# Patient Record
Sex: FEMALE | Race: ASIAN | NOT HISPANIC OR LATINO
[De-identification: names, ages, dates, MRNs, and addresses within clinical notes are randomized per-mention and may not be internally consistent; named-entity substitution may affect disease eponyms.]

---

## 2017-01-11 ENCOUNTER — APPOINTMENT (OUTPATIENT)
Dept: PULMONOLOGY | Facility: CLINIC | Age: 47
End: 2017-01-11

## 2017-01-11 VITALS
BODY MASS INDEX: 28.2 KG/M2 | DIASTOLIC BLOOD PRESSURE: 90 MMHG | SYSTOLIC BLOOD PRESSURE: 130 MMHG | HEART RATE: 90 BPM | OXYGEN SATURATION: 98 % | RESPIRATION RATE: 14 BRPM | TEMPERATURE: 98.2 F | HEIGHT: 68 IN | WEIGHT: 186.06 LBS

## 2017-01-12 LAB
ALBUMIN SERPL ELPH-MCNC: 4.2 G/DL
ALP BLD-CCNC: 64 U/L
ALT SERPL-CCNC: 13 U/L
ANION GAP SERPL CALC-SCNC: 17 MMOL/L
AST SERPL-CCNC: 18 U/L
BASOPHILS # BLD AUTO: 0.02 K/UL
BASOPHILS NFR BLD AUTO: 0.3 %
BILIRUB SERPL-MCNC: 0.7 MG/DL
BUN SERPL-MCNC: 9 MG/DL
CALCIUM SERPL-MCNC: 9.5 MG/DL
CHLORIDE SERPL-SCNC: 104 MMOL/L
CO2 SERPL-SCNC: 19 MMOL/L
CREAT SERPL-MCNC: 1.01 MG/DL
EOSINOPHIL # BLD AUTO: 0.07 K/UL
EOSINOPHIL NFR BLD AUTO: 1 %
GLUCOSE SERPL-MCNC: 69 MG/DL
HCT VFR BLD CALC: 43.5 %
HGB BLD-MCNC: 13.9 G/DL
IMM GRANULOCYTES NFR BLD AUTO: 0.1 %
LYMPHOCYTES # BLD AUTO: 1.24 K/UL
LYMPHOCYTES NFR BLD AUTO: 18.4 %
MAN DIFF?: NORMAL
MCHC RBC-ENTMCNC: 29.6 PG
MCHC RBC-ENTMCNC: 32 GM/DL
MCV RBC AUTO: 92.6 FL
MONOCYTES # BLD AUTO: 0.44 K/UL
MONOCYTES NFR BLD AUTO: 6.5 %
NEUTROPHILS # BLD AUTO: 4.96 K/UL
NEUTROPHILS NFR BLD AUTO: 73.7 %
PLATELET # BLD AUTO: 296 K/UL
POTASSIUM SERPL-SCNC: 4.3 MMOL/L
PROT SERPL-MCNC: 7.6 G/DL
RBC # BLD: 4.7 M/UL
RBC # FLD: 13.5 %
SODIUM SERPL-SCNC: 140 MMOL/L
WBC # FLD AUTO: 6.74 K/UL

## 2017-01-13 LAB — TOTAL IGE SMQN RAST: 5 KU/L

## 2017-01-20 ENCOUNTER — APPOINTMENT (OUTPATIENT)
Dept: PULMONOLOGY | Facility: CLINIC | Age: 47
End: 2017-01-20

## 2017-02-13 ENCOUNTER — MEDICATION RENEWAL (OUTPATIENT)
Age: 47
End: 2017-02-13

## 2017-02-15 ENCOUNTER — MEDICATION RENEWAL (OUTPATIENT)
Age: 47
End: 2017-02-15

## 2017-06-16 ENCOUNTER — APPOINTMENT (OUTPATIENT)
Dept: PULMONOLOGY | Facility: CLINIC | Age: 47
End: 2017-06-16

## 2017-06-16 VITALS
RESPIRATION RATE: 16 BRPM | SYSTOLIC BLOOD PRESSURE: 110 MMHG | TEMPERATURE: 98 F | HEIGHT: 68 IN | OXYGEN SATURATION: 98 % | DIASTOLIC BLOOD PRESSURE: 79 MMHG | HEART RATE: 71 BPM | BODY MASS INDEX: 28.19 KG/M2 | WEIGHT: 186 LBS

## 2017-06-16 RX ORDER — LIOTHYRONINE SODIUM 5 UG/1
5 TABLET ORAL
Qty: 45 | Refills: 0 | Status: ACTIVE | COMMUNITY
Start: 2017-01-20

## 2017-06-16 RX ORDER — FLUTICASONE PROPIONATE AND SALMETEROL XINAFOATE 115; 21 UG/1; UG/1
115-21 AEROSOL, METERED RESPIRATORY (INHALATION) TWICE DAILY
Qty: 1 | Refills: 5 | Status: DISCONTINUED | COMMUNITY
Start: 2017-02-15 | End: 2017-06-16

## 2017-06-27 ENCOUNTER — MEDICATION RENEWAL (OUTPATIENT)
Age: 47
End: 2017-06-27

## 2017-07-19 ENCOUNTER — MEDICATION RENEWAL (OUTPATIENT)
Age: 47
End: 2017-07-19

## 2017-08-13 ENCOUNTER — MOBILE ON CALL (OUTPATIENT)
Age: 47
End: 2017-08-13

## 2017-10-02 ENCOUNTER — MEDICATION RENEWAL (OUTPATIENT)
Age: 47
End: 2017-10-02

## 2017-10-02 RX ORDER — MOMETASONE 50 UG/1
50 SPRAY, METERED NASAL DAILY
Qty: 3 | Refills: 0 | Status: ACTIVE | COMMUNITY
Start: 2017-10-02 | End: 1900-01-01

## 2017-10-03 ENCOUNTER — MEDICATION RENEWAL (OUTPATIENT)
Age: 47
End: 2017-10-03

## 2017-11-01 ENCOUNTER — MEDICATION RENEWAL (OUTPATIENT)
Age: 47
End: 2017-11-01

## 2017-11-29 ENCOUNTER — MEDICATION RENEWAL (OUTPATIENT)
Age: 47
End: 2017-11-29

## 2017-12-14 ENCOUNTER — MEDICATION RENEWAL (OUTPATIENT)
Age: 47
End: 2017-12-14

## 2017-12-26 ENCOUNTER — MEDICATION RENEWAL (OUTPATIENT)
Age: 47
End: 2017-12-26

## 2018-02-16 ENCOUNTER — MEDICATION RENEWAL (OUTPATIENT)
Age: 48
End: 2018-02-16

## 2018-02-20 ENCOUNTER — APPOINTMENT (OUTPATIENT)
Dept: PULMONOLOGY | Facility: CLINIC | Age: 48
End: 2018-02-20
Payer: COMMERCIAL

## 2018-02-20 VITALS
SYSTOLIC BLOOD PRESSURE: 130 MMHG | HEART RATE: 72 BPM | TEMPERATURE: 97.5 F | HEIGHT: 65 IN | WEIGHT: 184 LBS | RESPIRATION RATE: 18 BRPM | DIASTOLIC BLOOD PRESSURE: 80 MMHG | OXYGEN SATURATION: 98 % | BODY MASS INDEX: 30.66 KG/M2

## 2018-02-20 LAB
BASOPHILS # BLD AUTO: 0.01 K/UL
BASOPHILS NFR BLD AUTO: 0.2 %
EOSINOPHIL # BLD AUTO: 0.07 K/UL
EOSINOPHIL # BLD MANUAL: 50 /UL
EOSINOPHIL NFR BLD AUTO: 1.2 %
HCT VFR BLD CALC: 41.4 %
HGB BLD-MCNC: 13.7 G/DL
IMM GRANULOCYTES NFR BLD AUTO: 0.2 %
LYMPHOCYTES # BLD AUTO: 1.61 K/UL
LYMPHOCYTES NFR BLD AUTO: 28 %
MAN DIFF?: NORMAL
MCHC RBC-ENTMCNC: 29.6 PG
MCHC RBC-ENTMCNC: 33.1 GM/DL
MCV RBC AUTO: 89.4 FL
MONOCYTES # BLD AUTO: 0.28 K/UL
MONOCYTES NFR BLD AUTO: 4.9 %
NEUTROPHILS # BLD AUTO: 3.76 K/UL
NEUTROPHILS NFR BLD AUTO: 65.5 %
PLATELET # BLD AUTO: 337 K/UL
RBC # BLD: 4.63 M/UL
RBC # FLD: 13.5 %
WBC # FLD AUTO: 5.74 K/UL

## 2018-02-20 PROCEDURE — 36415 COLL VENOUS BLD VENIPUNCTURE: CPT

## 2018-02-20 PROCEDURE — 99215 OFFICE O/P EST HI 40 MIN: CPT | Mod: 25

## 2018-02-25 LAB
ALBUMIN SERPL ELPH-MCNC: 4 G/DL
ALP BLD-CCNC: 67 U/L
ALT SERPL-CCNC: 14 U/L
ANION GAP SERPL CALC-SCNC: 13 MMOL/L
AST SERPL-CCNC: 20 U/L
BILIRUB SERPL-MCNC: 0.4 MG/DL
BUN SERPL-MCNC: 6 MG/DL
CALCIUM SERPL-MCNC: 9.4 MG/DL
CHLORIDE SERPL-SCNC: 97 MMOL/L
CO2 SERPL-SCNC: 23 MMOL/L
CREAT SERPL-MCNC: 0.79 MG/DL
GLUCOSE SERPL-MCNC: 77 MG/DL
POTASSIUM SERPL-SCNC: 4.2 MMOL/L
PROT SERPL-MCNC: 7.7 G/DL
SODIUM SERPL-SCNC: 133 MMOL/L
TOTAL IGE SMQN RAST: 7 KU/L

## 2018-02-27 ENCOUNTER — MEDICATION RENEWAL (OUTPATIENT)
Age: 48
End: 2018-02-27

## 2018-03-11 ENCOUNTER — RX RENEWAL (OUTPATIENT)
Age: 48
End: 2018-03-11

## 2018-03-19 ENCOUNTER — RX RENEWAL (OUTPATIENT)
Age: 48
End: 2018-03-19

## 2018-03-21 ENCOUNTER — MEDICATION RENEWAL (OUTPATIENT)
Age: 48
End: 2018-03-21

## 2018-03-22 ENCOUNTER — MEDICATION RENEWAL (OUTPATIENT)
Age: 48
End: 2018-03-22

## 2018-03-30 ENCOUNTER — APPOINTMENT (OUTPATIENT)
Dept: PULMONOLOGY | Facility: CLINIC | Age: 48
End: 2018-03-30
Payer: COMMERCIAL

## 2018-03-30 PROCEDURE — 94729 DIFFUSING CAPACITY: CPT

## 2018-03-30 PROCEDURE — 94726 PLETHYSMOGRAPHY LUNG VOLUMES: CPT

## 2018-03-30 PROCEDURE — 94060 EVALUATION OF WHEEZING: CPT

## 2018-04-05 ENCOUNTER — TRANSCRIPTION ENCOUNTER (OUTPATIENT)
Age: 48
End: 2018-04-05

## 2018-04-10 ENCOUNTER — RESULT REVIEW (OUTPATIENT)
Age: 48
End: 2018-04-10

## 2018-07-02 ENCOUNTER — RX RENEWAL (OUTPATIENT)
Age: 48
End: 2018-07-02

## 2018-08-16 ENCOUNTER — APPOINTMENT (OUTPATIENT)
Dept: PULMONOLOGY | Facility: CLINIC | Age: 48
End: 2018-08-16
Payer: COMMERCIAL

## 2018-08-16 VITALS
HEART RATE: 57 BPM | HEIGHT: 67 IN | TEMPERATURE: 97.4 F | BODY MASS INDEX: 28.88 KG/M2 | RESPIRATION RATE: 18 BRPM | WEIGHT: 184 LBS | DIASTOLIC BLOOD PRESSURE: 90 MMHG | SYSTOLIC BLOOD PRESSURE: 130 MMHG | OXYGEN SATURATION: 99 %

## 2018-08-16 PROCEDURE — 99215 OFFICE O/P EST HI 40 MIN: CPT

## 2018-08-16 RX ORDER — MOXIFLOXACIN HYDROCHLORIDE TABLETS, 400 MG 400 MG/1
400 TABLET, FILM COATED ORAL DAILY
Qty: 5 | Refills: 0 | Status: DISCONTINUED | COMMUNITY
Start: 2017-07-19 | End: 2018-08-16

## 2018-08-16 RX ORDER — PREDNISONE 10 MG/1
10 TABLET ORAL DAILY
Qty: 60 | Refills: 0 | Status: DISCONTINUED | COMMUNITY
Start: 2017-07-19 | End: 2018-08-16

## 2018-10-26 ENCOUNTER — MEDICATION RENEWAL (OUTPATIENT)
Age: 48
End: 2018-10-26

## 2018-10-26 ENCOUNTER — TRANSCRIPTION ENCOUNTER (OUTPATIENT)
Age: 48
End: 2018-10-26

## 2019-01-28 ENCOUNTER — RX RENEWAL (OUTPATIENT)
Age: 49
End: 2019-01-28

## 2019-02-01 ENCOUNTER — MEDICATION RENEWAL (OUTPATIENT)
Age: 49
End: 2019-02-01

## 2019-02-19 ENCOUNTER — MEDICATION RENEWAL (OUTPATIENT)
Age: 49
End: 2019-02-19

## 2019-02-19 RX ORDER — MOXIFLOXACIN HYDROCHLORIDE TABLETS, 400 MG 400 MG/1
400 TABLET, FILM COATED ORAL DAILY
Qty: 7 | Refills: 0 | Status: DISCONTINUED | COMMUNITY
Start: 2018-10-26 | End: 2019-02-19

## 2019-08-05 ENCOUNTER — RX RENEWAL (OUTPATIENT)
Age: 49
End: 2019-08-05

## 2019-08-09 ENCOUNTER — MEDICATION RENEWAL (OUTPATIENT)
Age: 49
End: 2019-08-09

## 2019-08-21 ENCOUNTER — MEDICATION RENEWAL (OUTPATIENT)
Age: 49
End: 2019-08-21

## 2019-08-22 ENCOUNTER — MEDICATION RENEWAL (OUTPATIENT)
Age: 49
End: 2019-08-22

## 2019-08-28 ENCOUNTER — TRANSCRIPTION ENCOUNTER (OUTPATIENT)
Age: 49
End: 2019-08-28

## 2019-08-29 RX ORDER — FLUTICASONE PROPIONATE 50 UG/1
50 SPRAY, METERED NASAL TWICE DAILY
Qty: 3 | Refills: 1 | Status: ACTIVE | OUTPATIENT
Start: 2017-06-16

## 2019-09-19 ENCOUNTER — TRANSCRIPTION ENCOUNTER (OUTPATIENT)
Age: 49
End: 2019-09-19

## 2019-09-20 NOTE — LETTER CLOSING
[Sincerely,] : Sincerely, [Mercy Garsia MD, FCCP] : Mercy Garsia MD, FCCP [Director, Pulmonary Hypertension Program] : Director, Pulmonary Hypertension Program [[facility]] : [facility] [Division of Pulmonary, Critical Care and Sleep Medicine] : Division of Pulmonary, Critical Care and Sleep Medicine [[facility]] : [facility] [Associate Professor of Medicine] : Associate Professor of Medicine

## 2019-09-20 NOTE — CONSULT LETTER
[Mercy Garsia MD, FCCP] : Mercy Garsia MD, FCCP [[facility]] : [facility] [Director, Pulmonary Hypertension Program] : Director, Pulmonary Hypertension Program [Division of Pulmonary, Critical Care and Sleep Medicine] : Division of Pulmonary, Critical Care and Sleep Medicine [Associate Professor of Medicine] : Associate Professor of Medicine

## 2019-09-20 NOTE — REVIEW OF SYSTEMS
[Cough] : cough [Wheezing] : wheezing [Dyspnea] : dyspnea [Hay Fever] : hay fever [Thyroid Problem] : thyroid problem [Negative] : Pulmonary Hypertension

## 2019-09-23 ENCOUNTER — LABORATORY RESULT (OUTPATIENT)
Age: 49
End: 2019-09-23

## 2019-09-23 ENCOUNTER — MED ADMIN CHARGE (OUTPATIENT)
Age: 49
End: 2019-09-23

## 2019-09-23 ENCOUNTER — APPOINTMENT (OUTPATIENT)
Dept: PULMONOLOGY | Facility: CLINIC | Age: 49
End: 2019-09-23
Payer: COMMERCIAL

## 2019-09-23 VITALS
SYSTOLIC BLOOD PRESSURE: 126 MMHG | DIASTOLIC BLOOD PRESSURE: 85 MMHG | HEART RATE: 80 BPM | HEIGHT: 67 IN | BODY MASS INDEX: 28.41 KG/M2 | WEIGHT: 181 LBS | TEMPERATURE: 98.6 F | RESPIRATION RATE: 16 BRPM

## 2019-09-23 DIAGNOSIS — R07.89 OTHER CHEST PAIN: ICD-10-CM

## 2019-09-23 PROCEDURE — 90686 IIV4 VACC NO PRSV 0.5 ML IM: CPT

## 2019-09-23 PROCEDURE — 99215 OFFICE O/P EST HI 40 MIN: CPT | Mod: 25

## 2019-09-23 PROCEDURE — 36415 COLL VENOUS BLD VENIPUNCTURE: CPT

## 2019-09-23 PROCEDURE — G0008: CPT

## 2019-09-23 NOTE — PHYSICAL EXAM
[General Appearance - Well Developed] : well developed [Normal Appearance] : normal appearance [Well Groomed] : well groomed [General Appearance - In No Acute Distress] : no acute distress [General Appearance - Well Nourished] : well nourished [No Deformities] : no deformities [Normal Conjunctiva] : the conjunctiva exhibited no abnormalities [Eyelids - No Xanthelasma] : the eyelids demonstrated no xanthelasmas [Neck Appearance] : the appearance of the neck was normal [Normal Oropharynx] : normal oropharynx [Heart Rate And Rhythm] : heart rate and rhythm were normal [Heart Sounds] : normal S1 and S2 [Murmurs] : no murmurs present [Respiration, Rhythm And Depth] : normal respiratory rhythm and effort [Exaggerated Use Of Accessory Muscles For Inspiration] : no accessory muscle use [Lesions: ____] : lesions [unfilled] [Abdomen Soft] : soft [Abdomen Tenderness] : non-tender [Abdomen Mass (___ Cm)] : no abdominal mass palpated [Gait - Sufficient For Exercise Testing] : the gait was sufficient for exercise testing [Abnormal Walk] : normal gait [No Venous Stasis] : no venous stasis [Skin Color & Pigmentation] : normal skin color and pigmentation [Skin Lesions] : no skin lesions [No Skin Ulcers] : no skin ulcer [Cranial Nerves] : cranial nerves 2-12 were intact [No Xanthoma] : no  xanthoma was observed [Impaired Insight] : insight and judgment were intact [Affect] : the affect was normal [Oriented To Time, Place, And Person] : oriented to person, place, and time [Normal Oral Mucosa] : normal oral mucosa [Petechial Hemorrhages (___cm)] : no petechial hemorrhages [Nail Clubbing] : no clubbing of the fingernails [Cyanosis, Localized] : no localized cyanosis [] : no ischemic changes [Auscultation Breath Sounds / Voice Sounds] : lungs were clear to auscultation bilaterally [FreeTextEntry1] : nares  erythematous

## 2019-09-23 NOTE — REASON FOR VISIT
[Follow-Up] : a follow-up visit [Shortness of Breath] : shortness of Breath [Asthma] : asthma [Cough] : cough [FreeTextEntry1] : asthma [FreeTextEntry2] : uri since last week treated w/biaxin & amoxicillin & medrol pack w/Dr Paula

## 2019-09-23 NOTE — HISTORY OF PRESENT ILLNESS
[Worsened] : have worsened [Cough] : worsened coughing [Difficulty Breathing During Exertion] : worsened dyspnea on exertion [Oxygen] : the patient uses no supplemental oxygen [FreeTextEntry1] : ---------50yo w/untreated asthma- self referred for 2nd opinion for her bronchial asthma--------has not been on prednisone has never been intubated-- no history of fever chills rigors chest hemoptysis----------No history of , fever, chills , rigors, chestpain, or hemoptysis. Questionable history of Raynauds phenomenon. No h/o significant weight loss in last few months. No history of liver dysfunction , collagen vascular disorder or chronic thromboembolic disease. I would classify her dyspnea as WHO  FUNCTIONAL CLASS II---------------------\par PFT 2018 mild obstruction\par cxr- 2018 clear lungs\par ---------------\par ----- 5/ 2015 ---- ct chest -----------4mm left apical nodule and 3-4 m, left lingula nodule .\par h/o allergy testing w/allergy to mold-------------\par lifelong nonsmoker-------------\par \par \par sept 2019- asthma- s/p urgicenter visit last week for CP- states EKG normal. No resp complaints-- on  inhaled steroids and bronchodilators, no further exacerbations

## 2019-09-24 LAB
25(OH)D3 SERPL-MCNC: 23.3 NG/ML
ALBUMIN SERPL ELPH-MCNC: 4.4 G/DL
ALP BLD-CCNC: 63 U/L
ALT SERPL-CCNC: 18 U/L
ANION GAP SERPL CALC-SCNC: 12 MMOL/L
AST SERPL-CCNC: 19 U/L
BASOPHILS # BLD AUTO: 0.03 K/UL
BASOPHILS NFR BLD AUTO: 0.5 %
BILIRUB SERPL-MCNC: 0.5 MG/DL
BUN SERPL-MCNC: 8 MG/DL
CALCIUM SERPL-MCNC: 9.3 MG/DL
CHLORIDE SERPL-SCNC: 102 MMOL/L
CO2 SERPL-SCNC: 23 MMOL/L
CREAT SERPL-MCNC: 0.87 MG/DL
DEPRECATED KAPPA LC FREE/LAMBDA SER: 1.14 RATIO
EOSINOPHIL # BLD AUTO: 0.05 K/UL
EOSINOPHIL NFR BLD AUTO: 0.8 %
FERRITIN SERPL-MCNC: 16 NG/ML
GLUCOSE SERPL-MCNC: 84 MG/DL
HCT VFR BLD CALC: 41.6 %
HGB BLD-MCNC: 13.5 G/DL
IGA SER QL IEP: 95 MG/DL
IGG SER QL IEP: 1342 MG/DL
IGM SER QL IEP: 309 MG/DL
IMM GRANULOCYTES NFR BLD AUTO: 0.2 %
KAPPA LC CSF-MCNC: 1.33 MG/DL
KAPPA LC SERPL-MCNC: 1.51 MG/DL
LYMPHOCYTES # BLD AUTO: 1.66 K/UL
LYMPHOCYTES NFR BLD AUTO: 27.1 %
MAN DIFF?: NORMAL
MCHC RBC-ENTMCNC: 29.2 PG
MCHC RBC-ENTMCNC: 32.5 GM/DL
MCV RBC AUTO: 89.8 FL
MONOCYTES # BLD AUTO: 0.49 K/UL
MONOCYTES NFR BLD AUTO: 8 %
NEUTROPHILS # BLD AUTO: 3.89 K/UL
NEUTROPHILS NFR BLD AUTO: 63.4 %
PLATELET # BLD AUTO: 281 K/UL
POTASSIUM SERPL-SCNC: 4.3 MMOL/L
PROT SERPL-MCNC: 7.3 G/DL
RBC # BLD: 4.63 M/UL
RBC # FLD: 12.7 %
SODIUM SERPL-SCNC: 137 MMOL/L
TSH SERPL-ACNC: 0.14 UIU/ML
WBC # FLD AUTO: 6.13 K/UL

## 2019-09-24 RX ORDER — LEVOFLOXACIN 500 MG/1
500 TABLET, FILM COATED ORAL DAILY
Qty: 7 | Refills: 0 | Status: DISCONTINUED | COMMUNITY
Start: 2019-02-19 | End: 2019-09-24

## 2019-10-01 LAB — TOTAL IGE SMQN RAST: 8 KU/L

## 2019-10-02 LAB
A ALTERNATA IGE QN: <0.1 KUA/L
A FUMIGATUS IGE QN: <0.1 KUA/L
BERMUDA GRASS IGE QN: <0.1 KUA/L
BOXELDER IGE QN: <0.1 KUA/L
C HERBARUM IGE QN: <0.1 KUA/L
CALIF WALNUT IGE QN: <0.1 KUA/L
CAT DANDER IGE QN: <0.1 KUA/L
CMN PIGWEED IGE QN: <0.1 KUA/L
COMMON RAGWEED IGE QN: <0.1 KUA/L
COTTONWOOD IGE QN: <0.1 KUA/L
D FARINAE IGE QN: <0.1 KUA/L
D PTERONYSS IGE QN: <0.1 KUA/L
DEPRECATED A ALTERNATA IGE RAST QL: 0
DEPRECATED A FUMIGATUS IGE RAST QL: 0
DEPRECATED BERMUDA GRASS IGE RAST QL: 0
DEPRECATED BOXELDER IGE RAST QL: 0
DEPRECATED C HERBARUM IGE RAST QL: 0
DEPRECATED CAT DANDER IGE RAST QL: 0
DEPRECATED COMMON PIGWEED IGE RAST QL: 0
DEPRECATED COMMON RAGWEED IGE RAST QL: 0
DEPRECATED COTTONWOOD IGE RAST QL: 0
DEPRECATED D FARINAE IGE RAST QL: 0
DEPRECATED D PTERONYSS IGE RAST QL: 0
DEPRECATED DOG DANDER IGE RAST QL: 0
DEPRECATED GOOSEFOOT IGE RAST QL: 0
DEPRECATED LONDON PLANE IGE RAST QL: 0
DEPRECATED MUGWORT IGE RAST QL: 0
DEPRECATED P NOTATUM IGE RAST QL: 0
DEPRECATED RED CEDAR IGE RAST QL: 0
DEPRECATED ROACH IGE RAST QL: 0
DEPRECATED SHEEP SORREL IGE RAST QL: 0
DEPRECATED SILVER BIRCH IGE RAST QL: 0
DEPRECATED TIMOTHY IGE RAST QL: 0
DEPRECATED WHITE ASH IGE RAST QL: 0
DEPRECATED WHITE OAK IGE RAST QL: 0
DOG DANDER IGE QN: <0.1 KUA/L
GOOSEFOOT IGE QN: <0.1 KUA/L
LONDON PLANE IGE QN: <0.1 KUA/L
MUGWORT IGE QN: <0.1 KUA/L
MULBERRY (T70) CLASS: 0
MULBERRY (T70) CONC: <0.1 KUA/L
P NOTATUM IGE QN: <0.1 KUA/L
RED CEDAR IGE QN: <0.1 KUA/L
ROACH IGE QN: <0.1 KUA/L
SHEEP SORREL IGE QN: <0.1 KUA/L
SILVER BIRCH IGE QN: <0.1 KUA/L
TIMOTHY IGE QN: <0.1 KUA/L
TREE ALLERG MIX1 IGE QL: 0
WHITE ASH IGE QN: <0.1 KUA/L
WHITE ELM IGE QN: 0
WHITE ELM IGE QN: <0.1 KUA/L
WHITE OAK IGE QN: <0.1 KUA/L

## 2020-03-23 ENCOUNTER — TRANSCRIPTION ENCOUNTER (OUTPATIENT)
Age: 50
End: 2020-03-23

## 2020-06-23 ENCOUNTER — TRANSCRIPTION ENCOUNTER (OUTPATIENT)
Age: 50
End: 2020-06-23

## 2020-07-28 ENCOUNTER — APPOINTMENT (OUTPATIENT)
Dept: PULMONOLOGY | Facility: CLINIC | Age: 50
End: 2020-07-28
Payer: COMMERCIAL

## 2020-07-28 PROCEDURE — 99215 OFFICE O/P EST HI 40 MIN: CPT | Mod: 95

## 2020-07-28 NOTE — HISTORY OF PRESENT ILLNESS
[Home] : at home, [unfilled] , at the time of the visit. [Medical Office: ([facility])___] : at the medical office located in  [Verbal consent obtained from patient] : the patient, [unfilled] [TextBox_4] : -49yo w/untreated asthma- self referred for 2nd opinion for her bronchial asthma--------has not been on prednisone has never been intubated-- no history of fever chills rigors chest hemoptysis----------No history of , fever, chills , rigors, chestpain, or hemoptysis. Questionable history of Raynauds phenomenon. No h/o significant weight loss in last few months. No history of liver dysfunction , collagen vascular disorder or chronic thromboembolic disease. I would classify her dyspnea as WHO FUNCTIONAL CLASS II---------------------\par PFT 2018 mild obstruction\par cxr- 2018 clear lungs\par ---------------\par ----- 5/ 2015 ---- ct chest -----------4mm left apical nodule and 3-4 m, left lingula nodule .\par h/o allergy testing w/allergy to mold-------------\par lifelong nonsmoker-------------\par \par \par sept 2019- asthma- s/p urgicenter visit last week for CP- states EKG normal. No resp complaints-- on inhaled steroids and bronchodilators, no further exacerbations \par  \par july 2020----  has been using albuterol more frequently--no fever , cough

## 2020-07-28 NOTE — DISCUSSION/SUMMARY
[FreeTextEntry1] : -Assessment and Plan--------49 yo w/asthma exacerbation. continue symbicort. CONTINUE SALINE NASAL SPRAY - \par \par contd azelastine, continue Singulair, albuterol- Symbicort----\par lrepeat  PFT\par keep record of her  rescue inhaler use \par f/u in  after pft months\par \par Patient at this time will follow the above mentioned recommendations and return back for follow up visit. If you have any questions I can be reached at [phone] or [phone]. \par \par [facility], [facility] \par Director, Pulmonary Hypertension Program \par [facility] \par Division of Pulmonary, Critical Care and Sleep Medicine

## 2020-08-19 ENCOUNTER — APPOINTMENT (OUTPATIENT)
Dept: DISASTER EMERGENCY | Facility: CLINIC | Age: 50
End: 2020-08-19

## 2020-08-19 DIAGNOSIS — Z01.818 ENCOUNTER FOR OTHER PREPROCEDURAL EXAMINATION: ICD-10-CM

## 2020-08-20 LAB — SARS-COV-2 N GENE NPH QL NAA+PROBE: NOT DETECTED

## 2020-08-21 NOTE — LETTER CLOSING
[Sincerely,] : Sincerely, [Director, Pulmonary Hypertension Program] : Director, Pulmonary Hypertension Program [Mercy Garsia MD, FCCP] : Mercy Garsia MD, FCCP [Division of Pulmonary, Critical Care and Sleep Medicine] : Division of Pulmonary, Critical Care and Sleep Medicine [[facility]] : [facility] [[facility]] : [facility] [Associate Professor of Medicine] : Associate Professor of Medicine

## 2020-08-21 NOTE — CONSULT LETTER
[Mercy Garsia MD, FCCP] : Mercy Garsia MD, FCCP [Director, Pulmonary Hypertension Program] : Director, Pulmonary Hypertension Program [Division of Pulmonary, Critical Care and Sleep Medicine] : Division of Pulmonary, Critical Care and Sleep Medicine [[facility]] : [facility] [Associate Professor of Medicine] : Associate Professor of Medicine

## 2020-08-21 NOTE — REVIEW OF SYSTEMS
[Cough] : cough [Dyspnea] : dyspnea [Wheezing] : wheezing [Hay Fever] : hay fever [Thyroid Problem] : thyroid problem [Negative] : Sleep Disorder

## 2020-08-24 ENCOUNTER — APPOINTMENT (OUTPATIENT)
Dept: PULMONOLOGY | Facility: CLINIC | Age: 50
End: 2020-08-24
Payer: COMMERCIAL

## 2020-08-24 ENCOUNTER — MED ADMIN CHARGE (OUTPATIENT)
Age: 50
End: 2020-08-24

## 2020-08-24 VITALS
TEMPERATURE: 97.4 F | SYSTOLIC BLOOD PRESSURE: 137 MMHG | OXYGEN SATURATION: 98 % | HEIGHT: 67 IN | HEART RATE: 77 BPM | DIASTOLIC BLOOD PRESSURE: 85 MMHG | WEIGHT: 187 LBS | RESPIRATION RATE: 15 BRPM | BODY MASS INDEX: 29.35 KG/M2

## 2020-08-24 VITALS — HEIGHT: 67 IN | WEIGHT: 187 LBS | TEMPERATURE: 97.4 F | HEART RATE: 75 BPM | BODY MASS INDEX: 29.35 KG/M2

## 2020-08-24 DIAGNOSIS — Z23 ENCOUNTER FOR IMMUNIZATION: ICD-10-CM

## 2020-08-24 LAB
ALBUMIN SERPL ELPH-MCNC: 4.6 G/DL
ALP BLD-CCNC: 65 U/L
ALT SERPL-CCNC: 18 U/L
ANION GAP SERPL CALC-SCNC: 11 MMOL/L
AST SERPL-CCNC: 18 U/L
BASOPHILS # BLD AUTO: 0.03 K/UL
BASOPHILS NFR BLD AUTO: 0.6 %
BILIRUB SERPL-MCNC: 0.4 MG/DL
BUN SERPL-MCNC: 7 MG/DL
CALCIUM SERPL-MCNC: 8.9 MG/DL
CHLORIDE SERPL-SCNC: 99 MMOL/L
CO2 SERPL-SCNC: 25 MMOL/L
CREAT SERPL-MCNC: 0.94 MG/DL
EOSINOPHIL # BLD AUTO: 0.05 K/UL
EOSINOPHIL NFR BLD AUTO: 1 %
GLUCOSE SERPL-MCNC: 90 MG/DL
HCT VFR BLD CALC: 42.5 %
HGB BLD-MCNC: 14.1 G/DL
IMM GRANULOCYTES NFR BLD AUTO: 0.2 %
LYMPHOCYTES # BLD AUTO: 1.74 K/UL
LYMPHOCYTES NFR BLD AUTO: 34.3 %
MAN DIFF?: NORMAL
MCHC RBC-ENTMCNC: 30.1 PG
MCHC RBC-ENTMCNC: 33.2 GM/DL
MCV RBC AUTO: 90.8 FL
MONOCYTES # BLD AUTO: 0.47 K/UL
MONOCYTES NFR BLD AUTO: 9.3 %
NEUTROPHILS # BLD AUTO: 2.78 K/UL
NEUTROPHILS NFR BLD AUTO: 54.6 %
PLATELET # BLD AUTO: 299 K/UL
POTASSIUM SERPL-SCNC: 4 MMOL/L
PROT SERPL-MCNC: 6.9 G/DL
RBC # BLD: 4.68 M/UL
RBC # FLD: 12.8 %
SARS-COV-2 IGG SERPL IA-ACNC: 0.17 INDEX
SARS-COV-2 IGG SERPL QL IA: NEGATIVE
SODIUM SERPL-SCNC: 135 MMOL/L
WBC # FLD AUTO: 5.08 K/UL

## 2020-08-24 PROCEDURE — 90686 IIV4 VACC NO PRSV 0.5 ML IM: CPT

## 2020-08-24 PROCEDURE — 36415 COLL VENOUS BLD VENIPUNCTURE: CPT

## 2020-08-24 PROCEDURE — ZZZZZ: CPT

## 2020-08-24 PROCEDURE — 94729 DIFFUSING CAPACITY: CPT

## 2020-08-24 PROCEDURE — 94010 BREATHING CAPACITY TEST: CPT

## 2020-08-24 PROCEDURE — 99215 OFFICE O/P EST HI 40 MIN: CPT | Mod: 25

## 2020-08-24 PROCEDURE — G0008: CPT

## 2020-08-24 PROCEDURE — 94726 PLETHYSMOGRAPHY LUNG VOLUMES: CPT

## 2020-08-24 NOTE — HISTORY OF PRESENT ILLNESS
[Worsened] : have worsened [Cough] : worsened coughing [Difficulty Breathing During Exertion] : worsened dyspnea on exertion [TextBox_4] : 52 yo w/untreated asthma- self referred for 2nd opinion for her bronchial asthma--------has not been on prednisone has never been intubated-- no history of fever chills rigors chest hemoptysis----------No history of , fever, chills , rigors, chestpain, or hemoptysis. Questionable history of Raynauds phenomenon. No h/o significant weight loss in last few months. No history of liver dysfunction , collagen vascular disorder or chronic thromboembolic disease. I would classify her dyspnea as WHO  FUNCTIONAL CLASS II---------------------\par PFT 2018 mild obstruction\par cxr- 2018 clear lungs\par ---------------\par ----- 5/ 2015 ---- ct chest -----------4mm left apical nodule and 3-4 m, left lingula nodule .\par h/o allergy testing w/allergy to mold-------------\par lifelong nonsmoker-------------\par \par \par sept 2019- asthma- s/p urgicenter visit last week for CP- states EKG normal. No resp complaints-- on  inhaled steroids and bronchodilators, no further exacerbations\par \par 8/2020 pft normal lung volumes\par august 2020 asthma controlled on symbicort and singulair, uses ventolin as needed [Oxygen] : the patient uses no supplemental oxygen [FreeTextEntry1] : ---------

## 2020-08-24 NOTE — PHYSICAL EXAM
[General Appearance - Well Developed] : well developed [Normal Appearance] : normal appearance [General Appearance - Well Nourished] : well nourished [Well Groomed] : well groomed [General Appearance - In No Acute Distress] : no acute distress [No Deformities] : no deformities [Normal Conjunctiva] : the conjunctiva exhibited no abnormalities [Eyelids - No Xanthelasma] : the eyelids demonstrated no xanthelasmas [Normal Oropharynx] : normal oropharynx [Neck Appearance] : the appearance of the neck was normal [Heart Rate And Rhythm] : heart rate and rhythm were normal [Heart Sounds] : normal S1 and S2 [Murmurs] : no murmurs present [Respiration, Rhythm And Depth] : normal respiratory rhythm and effort [Auscultation Breath Sounds / Voice Sounds] : lungs were clear to auscultation bilaterally [Exaggerated Use Of Accessory Muscles For Inspiration] : no accessory muscle use [Abdomen Soft] : soft [Lesions: ____] : lesions [unfilled] [Abdomen Mass (___ Cm)] : no abdominal mass palpated [Abdomen Tenderness] : non-tender [Abnormal Walk] : normal gait [Gait - Sufficient For Exercise Testing] : the gait was sufficient for exercise testing [Skin Color & Pigmentation] : normal skin color and pigmentation [Skin Lesions] : no skin lesions [No Venous Stasis] : no venous stasis [No Skin Ulcers] : no skin ulcer [No Xanthoma] : no  xanthoma was observed [Oriented To Time, Place, And Person] : oriented to person, place, and time [Cranial Nerves] : cranial nerves 2-12 were intact [Impaired Insight] : insight and judgment were intact [Normal Oral Mucosa] : normal oral mucosa [Affect] : the affect was normal [Nail Clubbing] : no clubbing of the fingernails [Cyanosis, Localized] : no localized cyanosis [] : no ischemic changes [Petechial Hemorrhages (___cm)] : no petechial hemorrhages [FreeTextEntry1] : nares  erythematous

## 2020-08-24 NOTE — REASON FOR VISIT
[Cough] : cough [Shortness of Breath] : shortness of Breath [Follow-Up] : a follow-up visit [Asthma] : asthma [FreeTextEntry1] : asthma

## 2020-08-24 NOTE — DISCUSSION/SUMMARY
[FreeTextEntry1] : -------------Assessment and Plan----51 yo with asthma well controlled-  continue symbicort.  CONTINUE  SALINE NASAL SPRAY,  Singulair,  albuterol- as needed\par labs drawn today \par s/p influenza vac\par f/u in 4 months\par \par Patient at this time  will follow  the above mentioned recommendations and return back for follow up visit. If you have any questions  I can be reached  at [phone]  or  [phone].  \par \par Mercy Garsia MD, FCCP \par Director, Pulmonary Hypertension Program \par [facility] \par Division of Pulmonary, Critical Care and Sleep Medicine \par  Professor of Medicine \par [employer]\par

## 2020-08-26 LAB — TOTAL IGE SMQN RAST: 6 KU/L

## 2020-10-01 ENCOUNTER — RX RENEWAL (OUTPATIENT)
Age: 50
End: 2020-10-01

## 2020-10-10 ENCOUNTER — NON-APPOINTMENT (OUTPATIENT)
Age: 50
End: 2020-10-10

## 2020-10-11 ENCOUNTER — RX RENEWAL (OUTPATIENT)
Age: 50
End: 2020-10-11

## 2021-02-25 ENCOUNTER — EMERGENCY (EMERGENCY)
Facility: HOSPITAL | Age: 51
LOS: 1 days | Discharge: HOME | End: 2021-02-25
Attending: EMERGENCY MEDICINE | Admitting: EMERGENCY MEDICINE
Payer: COMMERCIAL

## 2021-02-25 ENCOUNTER — TRANSCRIPTION ENCOUNTER (OUTPATIENT)
Age: 51
End: 2021-02-25

## 2021-02-25 VITALS
SYSTOLIC BLOOD PRESSURE: 168 MMHG | TEMPERATURE: 98 F | DIASTOLIC BLOOD PRESSURE: 86 MMHG | HEART RATE: 66 BPM | OXYGEN SATURATION: 100 % | WEIGHT: 182.1 LBS | RESPIRATION RATE: 18 BRPM

## 2021-02-25 VITALS
TEMPERATURE: 98 F | RESPIRATION RATE: 18 BRPM | DIASTOLIC BLOOD PRESSURE: 86 MMHG | HEART RATE: 87 BPM | OXYGEN SATURATION: 100 % | SYSTOLIC BLOOD PRESSURE: 136 MMHG

## 2021-02-25 LAB
ALBUMIN SERPL ELPH-MCNC: 3.7 G/DL — SIGNIFICANT CHANGE UP (ref 3.3–5)
ALP SERPL-CCNC: 59 U/L — SIGNIFICANT CHANGE UP (ref 40–120)
ALT FLD-CCNC: 22 U/L — SIGNIFICANT CHANGE UP (ref 12–78)
ANION GAP SERPL CALC-SCNC: 5 MMOL/L — SIGNIFICANT CHANGE UP (ref 5–17)
AST SERPL-CCNC: 24 U/L — SIGNIFICANT CHANGE UP (ref 15–37)
BASOPHILS # BLD AUTO: 0.03 K/UL — SIGNIFICANT CHANGE UP (ref 0–0.2)
BASOPHILS NFR BLD AUTO: 0.2 % — SIGNIFICANT CHANGE UP (ref 0–2)
BILIRUB SERPL-MCNC: 0.7 MG/DL — SIGNIFICANT CHANGE UP (ref 0.2–1.2)
BUN SERPL-MCNC: 5 MG/DL — LOW (ref 7–23)
CALCIUM SERPL-MCNC: 8.2 MG/DL — LOW (ref 8.5–10.1)
CHLORIDE SERPL-SCNC: 103 MMOL/L — SIGNIFICANT CHANGE UP (ref 96–108)
CO2 SERPL-SCNC: 25 MMOL/L — SIGNIFICANT CHANGE UP (ref 22–31)
CREAT SERPL-MCNC: 0.94 MG/DL — SIGNIFICANT CHANGE UP (ref 0.5–1.3)
EOSINOPHIL # BLD AUTO: 0.01 K/UL — SIGNIFICANT CHANGE UP (ref 0–0.5)
EOSINOPHIL NFR BLD AUTO: 0.1 % — SIGNIFICANT CHANGE UP (ref 0–6)
GLUCOSE SERPL-MCNC: 88 MG/DL — SIGNIFICANT CHANGE UP (ref 70–99)
HCG SERPL-ACNC: <1 MIU/ML — SIGNIFICANT CHANGE UP
HCT VFR BLD CALC: 40.6 % — SIGNIFICANT CHANGE UP (ref 34.5–45)
HGB BLD-MCNC: 13.4 G/DL — SIGNIFICANT CHANGE UP (ref 11.5–15.5)
IMM GRANULOCYTES NFR BLD AUTO: 0.5 % — SIGNIFICANT CHANGE UP (ref 0–1.5)
LYMPHOCYTES # BLD AUTO: 0.83 K/UL — LOW (ref 1–3.3)
LYMPHOCYTES # BLD AUTO: 5.7 % — LOW (ref 13–44)
MAGNESIUM SERPL-MCNC: 2 MG/DL — SIGNIFICANT CHANGE UP (ref 1.6–2.6)
MCHC RBC-ENTMCNC: 28.7 PG — SIGNIFICANT CHANGE UP (ref 27–34)
MCHC RBC-ENTMCNC: 33 GM/DL — SIGNIFICANT CHANGE UP (ref 32–36)
MCV RBC AUTO: 86.9 FL — SIGNIFICANT CHANGE UP (ref 80–100)
MONOCYTES # BLD AUTO: 0.68 K/UL — SIGNIFICANT CHANGE UP (ref 0–0.9)
MONOCYTES NFR BLD AUTO: 4.7 % — SIGNIFICANT CHANGE UP (ref 2–14)
NEUTROPHILS # BLD AUTO: 12.88 K/UL — HIGH (ref 1.8–7.4)
NEUTROPHILS NFR BLD AUTO: 88.8 % — HIGH (ref 43–77)
NRBC # BLD: 0 /100 WBCS — SIGNIFICANT CHANGE UP (ref 0–0)
PLATELET # BLD AUTO: 254 K/UL — SIGNIFICANT CHANGE UP (ref 150–400)
POTASSIUM SERPL-MCNC: 4.4 MMOL/L — SIGNIFICANT CHANGE UP (ref 3.5–5.3)
POTASSIUM SERPL-SCNC: 4.4 MMOL/L — SIGNIFICANT CHANGE UP (ref 3.5–5.3)
PROT SERPL-MCNC: 7.5 G/DL — SIGNIFICANT CHANGE UP (ref 6–8.3)
RBC # BLD: 4.67 M/UL — SIGNIFICANT CHANGE UP (ref 3.8–5.2)
RBC # FLD: 12.7 % — SIGNIFICANT CHANGE UP (ref 10.3–14.5)
SODIUM SERPL-SCNC: 133 MMOL/L — LOW (ref 135–145)
TROPONIN I SERPL-MCNC: <.015 NG/ML — SIGNIFICANT CHANGE UP (ref 0.01–0.04)
WBC # BLD: 14.5 K/UL — HIGH (ref 3.8–10.5)
WBC # FLD AUTO: 14.5 K/UL — HIGH (ref 3.8–10.5)

## 2021-02-25 PROCEDURE — 73660 X-RAY EXAM OF TOE(S): CPT

## 2021-02-25 PROCEDURE — 96360 HYDRATION IV INFUSION INIT: CPT

## 2021-02-25 PROCEDURE — 83735 ASSAY OF MAGNESIUM: CPT

## 2021-02-25 PROCEDURE — 70450 CT HEAD/BRAIN W/O DYE: CPT | Mod: 26,MF

## 2021-02-25 PROCEDURE — 85025 COMPLETE CBC W/AUTO DIFF WBC: CPT

## 2021-02-25 PROCEDURE — 84702 CHORIONIC GONADOTROPIN TEST: CPT

## 2021-02-25 PROCEDURE — 93005 ELECTROCARDIOGRAM TRACING: CPT

## 2021-02-25 PROCEDURE — 71045 X-RAY EXAM CHEST 1 VIEW: CPT | Mod: 26

## 2021-02-25 PROCEDURE — 80053 COMPREHEN METABOLIC PANEL: CPT

## 2021-02-25 PROCEDURE — 99284 EMERGENCY DEPT VISIT MOD MDM: CPT | Mod: 25

## 2021-02-25 PROCEDURE — 99285 EMERGENCY DEPT VISIT HI MDM: CPT

## 2021-02-25 PROCEDURE — 93010 ELECTROCARDIOGRAM REPORT: CPT

## 2021-02-25 PROCEDURE — 36415 COLL VENOUS BLD VENIPUNCTURE: CPT

## 2021-02-25 PROCEDURE — 84484 ASSAY OF TROPONIN QUANT: CPT

## 2021-02-25 PROCEDURE — 82962 GLUCOSE BLOOD TEST: CPT

## 2021-02-25 PROCEDURE — 71045 X-RAY EXAM CHEST 1 VIEW: CPT

## 2021-02-25 PROCEDURE — G1004: CPT

## 2021-02-25 PROCEDURE — 73660 X-RAY EXAM OF TOE(S): CPT | Mod: 26,LT

## 2021-02-25 PROCEDURE — 70450 CT HEAD/BRAIN W/O DYE: CPT

## 2021-02-25 RX ORDER — SODIUM CHLORIDE 9 MG/ML
1000 INJECTION INTRAMUSCULAR; INTRAVENOUS; SUBCUTANEOUS ONCE
Refills: 0 | Status: COMPLETED | OUTPATIENT
Start: 2021-02-25 | End: 2021-02-25

## 2021-02-25 RX ADMIN — SODIUM CHLORIDE 1000 MILLILITER(S): 9 INJECTION INTRAMUSCULAR; INTRAVENOUS; SUBCUTANEOUS at 14:40

## 2021-02-25 RX ADMIN — SODIUM CHLORIDE 1000 MILLILITER(S): 9 INJECTION INTRAMUSCULAR; INTRAVENOUS; SUBCUTANEOUS at 13:40

## 2021-02-25 NOTE — ED PROVIDER NOTE - PROVIDER TOKENS
PROVIDER:[TOKEN:[[id number]],FOLLOWUP:[1-3 Days]],PROVIDER:[TOKEN:[[id number]],FOLLOWUP:[1-3 Days]] PROVIDER:[TOKEN:[[id number]],FOLLOWUP:[1-3 Days]],PROVIDER:[TOKEN:[[id number]],FOLLOWUP:[1-3 Days]],PROVIDER:[TOKEN:[[id number]],FOLLOWUP:[1-3 Days]],PROVIDER:[TOKEN:[[id number]],FOLLOWUP:[1-3 Days]]

## 2021-02-25 NOTE — ED PROVIDER NOTE - NSFOLLOWUPINSTRUCTIONS_ED_ALL_ED_FT
drink plenty of fluids  recommend close follow up with cardiology and neurology, referrals provided   maliha tape toe and post op shoe, follow up with podiatry       Syncope    WHAT YOU NEED TO KNOW:    Syncope is also called fainting or passing out. Syncope is a sudden, temporary loss of consciousness, followed by a fall from a standing or sitting position. Syncope ranges from not serious to a sign of a more serious condition that needs to be treated. You can control some health conditions that cause syncope. Your healthcare providers can help you create a plan to manage syncope and prevent episodes.    DISCHARGE INSTRUCTIONS:    Seek care immediately if:   •You are bleeding because you hit your head when you fainted.       •You suddenly have double vision, difficulty speaking, numbness, and cannot move your arms or legs.      •You have chest pain and trouble breathing.      •You vomit blood or material that looks like coffee grounds.      •You see blood in your bowel movement.      Contact your healthcare provider if:   •You have new or worsening symptoms.      •You have another syncope episode.      •You have a headache, fast heartbeat, or feel too dizzy to stand up.      •You have questions or concerns about your condition or care.      Medicines:   •Medicines may be needed to help your heart pump strongly and regularly. Your healthcare provider may also make changes to any medicines that are causing syncope.       •Take your medicine as directed. Contact your healthcare provider if you think your medicine is not helping or if you have side effects. Tell him or her if you are allergic to any medicine. Keep a list of the medicines, vitamins, and herbs you take. Include the amounts, and when and why you take them. Bring the list or the pill bottles to follow-up visits. Carry your medicine list with you in case of an emergency.      Follow up with your healthcare provider as directed: Write down your questions so you remember to ask them during your visits.     Manage syncope:   •Keep a record of your syncope episodes. Include your symptoms and your activity before and after the episode. The record can help your healthcare provider find the cause of your syncope and help you manage episodes.      •Sit or lie down when needed. This includes when you feel dizzy, your throat is getting tight, and your vision changes. Raise your legs above the level of your heart.      •Take slow, deep breaths if you start to breathe faster with anxiety or fear. This can help decrease dizziness and the feeling that you might faint.       •Check your blood pressure often. This is important if you take medicine to lower your blood pressure. Check your blood pressure when you are lying down and when you are standing. Ask how often to check during the day. Keep a record of your blood pressure numbers. Your healthcare provider may use the record to help plan your treatment.  How to take a Blood Pressure           Prevent a syncope episode:   •Move slowly and let yourself get used to one position before you move to another position. This is very important when you change from a lying or sitting position to a standing position. Take some deep breaths before you stand up from a lying position. Stand up slowly. Sudden movements may cause a fainting spell. Sit on the side of the bed or couch for a few minutes before you stand up. If you are on bedrest, try to be upright for about 2 hours each day, or as directed. Do not lock your legs if you are standing for a long period of time. Move your legs and bend your knees to keep blood flowing.      •Follow your healthcare provider's recommendations. Your provider may recommend that you drink more liquids to prevent dehydration. You may also need to have more salt to keep your blood pressure from dropping too low and causing syncope. Your provider will tell you how much liquid and sodium to have each day. He or she will also tell you how much physical activity is safe for you. This will depend on what is causing your syncope.      •Watch for signs of low blood sugar. These include hunger, nervousness, sweating, and fast or fluttery heartbeats. Talk with your healthcare provider about ways to keep your blood sugar level steady.      •Do not strain if you are constipated. You may faint if you strain to have a bowel movement. Walking is the best way to get your bowels moving. Eat foods high in fiber to make it easier to have a bowel movement. Good examples are high-fiber cereals, beans, vegetables, and whole-grain breads. Prune juice may help make bowel movements softer.      •Be careful in hot weather. Heat can cause a syncope episode. Limit activity done outside on hot days. Physical activity in hot weather can lead to dehydration. This can cause an episode.

## 2021-02-25 NOTE — ED PROVIDER NOTE - PROGRESS NOTE DETAILS
Reevaluated patient at bedside.  Patient feeling much improved. no chest pain or shortness of breath. no neuro deficits. IVF infused. toe maliha taped and post op shoe applied. n/v intact. recommend close follow up with cardiology, neurology, and podiatry.  Discussed the results of all diagnostic testing in ED and copies of all reports given.   An opportunity to ask questions was given.  Discussed the importance of prompt, close medical follow-up.  Patient will return with any changes, concerns or persistent / worsening symptoms.  Understanding of all instructions verbalized.

## 2021-02-25 NOTE — ED PROVIDER NOTE - CARE PROVIDERS DIRECT ADDRESSES
,DirectAddress_Unknown,DirectAddress_Unknown ,DirectAddress_Unknown,DirectAddress_Unknown,DirectAddress_Unknown,[email or web address]

## 2021-02-25 NOTE — ED ADULT NURSE NOTE - OBJECTIVE STATEMENT
Received the patient in the Er. Patient is alert and oriented. Skin warm and dry. C/O syncopal episode. Patient did not eat after 8 pm yesterday. S/P hit the head. c/o headache.

## 2021-02-25 NOTE — ED PROVIDER NOTE - OBJECTIVE STATEMENT
50 year old female with history of hypothyroid, IBS, gerd, and asthma presents with syncopal event that occurred today PTA. 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 does not remember ending call, but next thing she remembers was waking up on couch. does not remember getting to the couch. woke up and felt hot and nausea. also had bruise to left side of forehead. believes she hit it on the end table by the couch, but does not remember. mild HA. went to urgent care and felt "a little out of it, like not very sharp with her answers". overall feels improved on arrival to ED. no nausea. slight HA. no weakness, n/t, dizziness. 50 year old female with history of hypothyroid, IBS, gerd, and asthma presents with syncopal event that occurred today PTA. 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 does not remember ending call, but next thing she remembers was waking up on couch. does not remember getting to the couch. woke up and felt hot and nausea. also had bruise to left side of forehead. believes she hit it on the end table by the couch, but does not remember. mild HA. went to urgent care and felt "a little out of it, like not very sharp with her answers". overall feels improved on arrival to ED. no nausea. slight HA. no weakness, n/t, dizziness. history of syncope years ago but felt the tunnel vision before syncopal event. did not feel tunnel vision today   also reports intermittent fasting. last ate last night at 8:00 pm. did not eat anything this am  PCP Fara Leigh

## 2021-02-25 NOTE — ED PROVIDER NOTE - CARE PROVIDER_API CALL
LAURI SHANKAR  Internal Medicine  [address] [place] [address]  Phone: [phone]  Fax: ()-  Follow Up Time: 1-3 Days    Ba Mcdermott  CARDIOVASCULAR DISEASE  [address]  [place] [address]  Phone: [phone]  Fax: [phone]  Follow Up Time: 1-3 Days   LAURI SHANKAR  Internal Medicine  [address] [place] [address]  Phone: ()-  Fax: ()-  Follow Up Time: 1-3 Days    Ba Mcdermott  CARDIOVASCULAR DISEASE  [address] [facility] [facility] [address] [facility], [address]  [place] [address]  Phone: [phone]  Fax: [phone]  Follow Up Time: 1-3 Days    Georgina Caraballo  NEUROLOGY  [address], [address]  [place] [address]  Phone: [phone]  Fax: [phone]  Follow Up Time: 1-3 Days    Deven Almanzar (DPM)  Podiatric Medicine and Surgery  [address] [place] [address]  Phone: [phone]  Fax: [phone]  Follow Up Time: 1-3 Days

## 2021-02-25 NOTE — ED PROVIDER NOTE - PMH
Asthma    GERD (gastroesophageal reflux disease)    Hypothyroid    IBS (irritable bowel syndrome)

## 2021-02-25 NOTE — ED PROVIDER NOTE - CLINICAL SUMMARY MEDICAL DECISION MAKING FREE TEXT BOX
syncopal event PTA. denies any chest pain or shortness of breath. no neuro deficits. reports intermittent fasting. differentials include but not limited to dehydration, vasovagal syncope, electrolyte abnormality, anemia, ICH, mass, skull fx. will check labs, EKG. CXR, CT head, cardiology follow up

## 2021-02-25 NOTE — ED PROVIDER NOTE - ATTENDING CONTRIBUTION TO CARE
51 yo F p/w syncopal episode and fall.   VSS  left forehead contusion/ecchymosis  left pinky digit 5th ecchomosis of foot, +ttp  no fnd    consider syncope 2/2 vasovagal, dehydration. lower suspicion for acs, pe. 49 yo F p/w syncopal episode and fall after intermittent fasting and not eating since yesterday  VSS  left forehead contusion/ecchymosis  left pinky digit 5th ecchymosis of foot, +ttp  no fnd    consider syncope 2/2 vasovagal, dehydration. lower suspicion for acs, pe.

## 2021-02-25 NOTE — ED PROVIDER NOTE - CARE PLAN
Principal Discharge DX:	Syncope  Secondary Diagnosis:	Closed nondisplaced fracture of proximal phalanx of lesser toe of left foot, initial encounter

## 2021-02-25 NOTE — ED PROVIDER NOTE - PATIENT PORTAL LINK FT
You can access the FollowMyHealth Patient Portal offered by [facility] by registering at the following website: [email or web address]. By joining [facility] FollowMyHealth portal, you will also be able to view your health information using other applications (apps) compatible with our system.

## 2021-03-25 NOTE — ED PROVIDER NOTE - DISCHARGE DATE
Pharmacy Progress Note - Medication Access    PAP application for Mr. Vinny Frye 63 y.o. 's Eliquis 5 mg BID submitted today.        Wt Readings from Last 3 Encounters:   03/07/21 227 lb (103 kg)   11/11/19 234 lb (106.1 kg)   10/21/19 232 lb (105.2 kg)     Lab Results   Component Value Date/Time    WBC 8.8 03/09/2021 03:12 AM    HGB 12.7 03/09/2021 03:12 AM    HCT 38.7 03/09/2021 03:12 AM    PLATELET 658 32/46/3680 03:12 AM    MCV 83.6 03/09/2021 03:12 AM     Lab Results   Component Value Date/Time    Sodium 139 03/09/2021 03:12 AM    Potassium 3.3 (L) 03/09/2021 03:12 AM    Chloride 106 03/09/2021 03:12 AM    CO2 29 03/09/2021 03:12 AM    Anion gap 4 (L) 03/09/2021 03:12 AM    Glucose 173 (H) 03/09/2021 03:12 AM    BUN 13 03/09/2021 03:12 AM    Creatinine 1.07 03/09/2021 03:12 AM    BUN/Creatinine ratio 12 03/09/2021 03:12 AM    GFR est AA >60 03/09/2021 03:12 AM    GFR est non-AA >60 03/09/2021 03:12 AM    Calcium 8.7 03/09/2021 03:12 AM         Thank you for the consult,  Shahnaz Messer, PharmD, BCACP, CDCES                CLINICAL PHARMACY CONSULT: MED RECONCILIATION/REVIEW ADDENDUM    For Pharmacy Admin Tracking Only    PHSO: PHSO Patient?: Yes  Total # of Interventions Recommended: Count: 1  - Refills Provided #: 1 25-Feb-2021

## 2021-06-16 PROBLEM — E03.9 HYPOTHYROIDISM, UNSPECIFIED: Chronic | Status: ACTIVE | Noted: 2021-02-25

## 2021-06-16 PROBLEM — K21.9 GASTRO-ESOPHAGEAL REFLUX DISEASE WITHOUT ESOPHAGITIS: Chronic | Status: ACTIVE | Noted: 2021-02-25

## 2021-06-16 PROBLEM — J45.909 UNSPECIFIED ASTHMA, UNCOMPLICATED: Chronic | Status: ACTIVE | Noted: 2021-02-25

## 2021-06-16 PROBLEM — K58.9 IRRITABLE BOWEL SYNDROME WITHOUT DIARRHEA: Chronic | Status: ACTIVE | Noted: 2021-02-25

## 2021-06-28 ENCOUNTER — APPOINTMENT (OUTPATIENT)
Dept: GASTROENTEROLOGY | Facility: CLINIC | Age: 51
End: 2021-06-28
Payer: COMMERCIAL

## 2021-06-28 ENCOUNTER — NON-APPOINTMENT (OUTPATIENT)
Age: 51
End: 2021-06-28

## 2021-06-28 VITALS
WEIGHT: 186 LBS | DIASTOLIC BLOOD PRESSURE: 80 MMHG | BODY MASS INDEX: 29.19 KG/M2 | SYSTOLIC BLOOD PRESSURE: 141 MMHG | TEMPERATURE: 97.3 F | HEART RATE: 77 BPM | OXYGEN SATURATION: 99 % | HEIGHT: 67 IN

## 2021-06-28 DIAGNOSIS — Z87.42 PERSONAL HISTORY OF OTHER DISEASES OF THE FEMALE GENITAL TRACT: ICD-10-CM

## 2021-06-28 DIAGNOSIS — K59.09 OTHER CONSTIPATION: ICD-10-CM

## 2021-06-28 PROCEDURE — 99212 OFFICE O/P EST SF 10 MIN: CPT

## 2021-06-28 NOTE — PHYSICAL EXAM
[General Appearance - Alert] : alert [General Appearance - In No Acute Distress] : in no acute distress [General Appearance - Well Nourished] : well nourished [] : no respiratory distress [Respiration, Rhythm And Depth] : normal respiratory rhythm and effort [Auscultation Breath Sounds / Voice Sounds] : lungs were clear to auscultation bilaterally [Apical Impulse] : the apical impulse was normal [Heart Rate And Rhythm] : heart rate was normal and rhythm regular [Heart Sounds] : normal S1 and S2 [Bowel Sounds] : normal bowel sounds [Abdomen Soft] : soft [Abdomen Tenderness] : non-tender

## 2021-06-28 NOTE — ASSESSMENT
[FreeTextEntry1] : Ms. Healy is a 51-year-old female who suffers from asthma and sees a pulmonary doctor regularly.  She is  on multiple inhalers.  He has a longstanding history of reflux and takes a proton pump inhibitor on a daily basis with no breakthrough symptoms.  She has a longstanding history of constipation predominant irritable bowel and takes Linzess with a fairly good clinical response.  It has been many years since she had a colonoscopy.  The patient was told to attempt to decrease her proton pump inhibitors.  She does not abuse tobacco caffeine or ethanol but is mildly overweight and weight reduction would be helpful in this regard.  I am hoping that we could taper her off decrease the dosage or at least switch her over to an H2 blocker.  The long-term ramifications of acid suppression were discussed.  The patient's IBS symptoms have been under good control but I did recommend that she have a colonoscopy since she is at age 51.  She will get back to me when she is ready to schedule it and I did remind her not to postpone it too much longer.  She will report back to me as well any change in her gastrointestinal status.

## 2021-06-28 NOTE — REVIEW OF SYSTEMS
[Abdominal Pain] : no abdominal pain [Vomiting] : no vomiting [Constipation] : constipation [Heartburn] : heartburn [Negative] : Musculoskeletal [FreeTextEntry6] : Occasional wheezing. [FreeTextEntry7] : Occasional incomplete evacuation mostly constipation predominant.

## 2021-06-28 NOTE — HISTORY OF PRESENT ILLNESS
[FreeTextEntry1] : I saw patient Chelle Healy the office today.  Patient is a 51-year-old female known to our service.  Patient suffers from gastroesophageal reflux disease and has an endoscopy done by another physician a number of years ago.  At times she did have evidence of reflux as well as ulcerations and has been taking acid suppression since then.  Currently she denies a history of breakthrough symptoms and has no dysphagia or unexplained weight loss.  The patient also has a longstanding history of irritable bowel, constipation predominant.  She takes Linzess 145 mcg once a day which usually provides her with the thorough evacuation.  Occasionally she does have the sensation as if it is complete but does not double up on the medication.  She is up-to-date on her gynecological examinations and denies a history of any blood in the stool.  He has 1 to 2 cups of caffeine a day does not smoke and rarely has ethanol.  She apparently had a colonoscopy done by another physician many years ago.

## 2021-08-16 RX ORDER — OMEPRAZOLE 10 MG/1
10 CAPSULE, DELAYED RELEASE ORAL DAILY
Qty: 90 | Refills: 3 | Status: ACTIVE | COMMUNITY
Start: 2021-07-19 | End: 1900-01-01

## 2021-09-07 RX ORDER — SUCRALFATE 1 G/10ML
1 SUSPENSION ORAL 4 TIMES DAILY
Qty: 3600 | Refills: 3 | Status: ACTIVE | COMMUNITY
Start: 2021-09-07 | End: 1900-01-01

## 2021-10-17 ENCOUNTER — NON-APPOINTMENT (OUTPATIENT)
Age: 51
End: 2021-10-17

## 2021-10-17 NOTE — CONSULT LETTER
[Mercy Garsia MD, FCCP] : Mercy Garsia MD, FCCP [Director, Pulmonary Hypertension Program] : Director, Pulmonary Hypertension Program [[facility]] : [facility] [Division of Pulmonary, Critical Care and Sleep Medicine] : Division of Pulmonary, Critical Care and Sleep Medicine [Associate Professor of Medicine] : Associate Professor of Medicine

## 2021-10-17 NOTE — LETTER CLOSING
[Sincerely,] : Sincerely, [Director, Pulmonary Hypertension Program] : Director, Pulmonary Hypertension Program [Mercy Garsia MD, FCCP] : Mercy Garsia MD, FCCP [[facility]] : [facility] [Division of Pulmonary, Critical Care and Sleep Medicine] : Division of Pulmonary, Critical Care and Sleep Medicine [Associate Professor of Medicine] : Associate Professor of Medicine [[facility]] : [facility]

## 2021-10-19 ENCOUNTER — NON-APPOINTMENT (OUTPATIENT)
Age: 51
End: 2021-10-19

## 2021-10-19 ENCOUNTER — OUTPATIENT (OUTPATIENT)
Dept: OUTPATIENT SERVICES | Facility: HOSPITAL | Age: 51
LOS: 1 days | End: 2021-10-19
Payer: COMMERCIAL

## 2021-10-19 ENCOUNTER — APPOINTMENT (OUTPATIENT)
Dept: RADIOLOGY | Facility: CLINIC | Age: 51
End: 2021-10-19
Payer: COMMERCIAL

## 2021-10-19 ENCOUNTER — LABORATORY RESULT (OUTPATIENT)
Age: 51
End: 2021-10-19

## 2021-10-19 ENCOUNTER — APPOINTMENT (OUTPATIENT)
Dept: PULMONOLOGY | Facility: CLINIC | Age: 51
End: 2021-10-19
Payer: COMMERCIAL

## 2021-10-19 VITALS
BODY MASS INDEX: 28.88 KG/M2 | HEART RATE: 88 BPM | SYSTOLIC BLOOD PRESSURE: 133 MMHG | RESPIRATION RATE: 16 BRPM | TEMPERATURE: 97.4 F | WEIGHT: 184 LBS | DIASTOLIC BLOOD PRESSURE: 81 MMHG | HEIGHT: 67 IN

## 2021-10-19 DIAGNOSIS — R06.02 SHORTNESS OF BREATH: ICD-10-CM

## 2021-10-19 DIAGNOSIS — R05.9 COUGH, UNSPECIFIED: ICD-10-CM

## 2021-10-19 PROCEDURE — 71046 X-RAY EXAM CHEST 2 VIEWS: CPT | Mod: 26

## 2021-10-19 PROCEDURE — 99215 OFFICE O/P EST HI 40 MIN: CPT | Mod: 25

## 2021-10-19 PROCEDURE — 71046 X-RAY EXAM CHEST 2 VIEWS: CPT

## 2021-10-19 NOTE — DISCUSSION/SUMMARY
[FreeTextEntry1] : -------------Assessment and Plan----50 yo with asthma well controlled-  continue symbicort.  CONTINUE  SALINE NASAL SPRAY,  Singulair,  albuterol- as needed\par labs drawn today \par prednisone w/taper\par ceftin prescribed by PMD\par CXR\par ?consider biologic therapy\par f/u in 2-3  months\par \par Patient at this time  will follow  the above mentioned recommendations and return back for follow up visit. If you have any questions  I can be reached  at [phone]  or  [phone].  \par \par Mercy Garsia MD, FCCP \par Director, Pulmonary Hypertension Program \par [facility] \par Division of Pulmonary, Critical Care and Sleep Medicine \par  Professor of Medicine \par [employer]\par \par CHELSIE ChengC\par \par

## 2021-10-19 NOTE — HISTORY OF PRESENT ILLNESS
Patient's belongings are locked in #4 outside of . Clothing/jacket were cut, only 1 shoe noted.    [Worsened] : have worsened [Difficulty Breathing During Exertion] : worsened dyspnea on exertion [Cough] : worsened coughing [TextBox_4] : 50 yo w/untreated asthma- self referred for 2nd opinion for her bronchial asthma--------has not been on prednisone has never been intubated-- no history of fever chills rigors chest hemoptysis----------No history of , fever, chills , rigors, chestpain, or hemoptysis. Questionable history of Raynauds phenomenon. No h/o significant weight loss in last few months. No history of liver dysfunction , collagen vascular disorder or chronic thromboembolic disease. I would classify her dyspnea as WHO  FUNCTIONAL CLASS II---------------------\par PFT 2018 mild obstruction\par cxr- 2018 clear lungs\par ---------------\par ----- 5/ 2015 ---- ct chest -----------4mm left apical nodule and 3-4 m, left lingula nodule .\par h/o allergy testing w/allergy to mold-------------\par lifelong nonsmoker-------------\par \par \par sept 2019- asthma- s/p urgicenter visit last week for CP- states EKG normal. No resp complaints-- on  inhaled steroids and bronchodilators, no further exacerbations\par \par 8/2020 pft normal lung volumes\par august 2020 asthma controlled on symbicort and singulair, uses ventolin as needed\par \par \par 10/2021 asthma exacerbation- started prednisone this weekend (20mg) on nebs and symbicort, covid swab neg, afebrile\par Up to date w/covid and influenza vac [Oxygen] : the patient uses no supplemental oxygen [FreeTextEntry1] : ---------

## 2021-10-19 NOTE — PHYSICAL EXAM
[General Appearance - Well Developed] : well developed [Normal Appearance] : normal appearance [Well Groomed] : well groomed [General Appearance - Well Nourished] : well nourished [No Deformities] : no deformities [General Appearance - In No Acute Distress] : no acute distress [Normal Conjunctiva] : the conjunctiva exhibited no abnormalities [Eyelids - No Xanthelasma] : the eyelids demonstrated no xanthelasmas [Normal Oropharynx] : normal oropharynx [Neck Appearance] : the appearance of the neck was normal [Heart Rate And Rhythm] : heart rate and rhythm were normal [Heart Sounds] : normal S1 and S2 [Murmurs] : no murmurs present [Exaggerated Use Of Accessory Muscles For Inspiration] : no accessory muscle use [Respiration, Rhythm And Depth] : normal respiratory rhythm and effort [Auscultation Breath Sounds / Voice Sounds] : lungs were clear to auscultation bilaterally [Lesions: ____] : lesions [unfilled] [Abdomen Soft] : soft [Abdomen Tenderness] : non-tender [Abdomen Mass (___ Cm)] : no abdominal mass palpated [Abnormal Walk] : normal gait [Gait - Sufficient For Exercise Testing] : the gait was sufficient for exercise testing [Skin Color & Pigmentation] : normal skin color and pigmentation [No Venous Stasis] : no venous stasis [Skin Lesions] : no skin lesions [No Skin Ulcers] : no skin ulcer [No Xanthoma] : no  xanthoma was observed [Cranial Nerves] : cranial nerves 2-12 were intact [Impaired Insight] : insight and judgment were intact [Oriented To Time, Place, And Person] : oriented to person, place, and time [Affect] : the affect was normal [Normal Oral Mucosa] : normal oral mucosa [Nail Clubbing] : no clubbing of the fingernails [Cyanosis, Localized] : no localized cyanosis [Petechial Hemorrhages (___cm)] : no petechial hemorrhages [] : no ischemic changes [FreeTextEntry1] : nares  erythematous

## 2021-10-20 LAB
COVID-19 NUCLEOCAPSID  GAM ANTIBODY INTERPRETATION: NEGATIVE
COVID-19 SPIKE DOMAIN ANTIBODY INTERPRETATION: POSITIVE
EOSINOPHIL # BLD MANUAL: 0 /UL
RAPID RVP RESULT: DETECTED
RV+EV RNA SPEC QL NAA+PROBE: DETECTED
SARS-COV-2 AB SERPL IA-ACNC: >250 U/ML
SARS-COV-2 AB SERPL QL IA: 0.06 INDEX
SARS-COV-2 RNA PNL RESP NAA+PROBE: NOT DETECTED

## 2021-10-21 LAB
A ALTERNATA IGE QN: <0.1 KUA/L
A FUMIGATUS IGE QN: <0.1 KUA/L
BERMUDA GRASS IGE QN: <0.1 KUA/L
BOXELDER IGE QN: <0.1 KUA/L
C HERBARUM IGE QN: <0.1 KUA/L
CALIF WALNUT IGE QN: <0.1 KUA/L
CAT DANDER IGE QN: <0.1 KUA/L
CMN PIGWEED IGE QN: <0.1 KUA/L
COMMON RAGWEED IGE QN: <0.1 KUA/L
COTTONWOOD IGE QN: <0.1 KUA/L
D FARINAE IGE QN: <0.1 KUA/L
D PTERONYSS IGE QN: <0.1 KUA/L
DEPRECATED A ALTERNATA IGE RAST QL: 0
DEPRECATED A FUMIGATUS IGE RAST QL: 0
DEPRECATED BERMUDA GRASS IGE RAST QL: 0
DEPRECATED BOXELDER IGE RAST QL: 0
DEPRECATED C HERBARUM IGE RAST QL: 0
DEPRECATED CAT DANDER IGE RAST QL: 0
DEPRECATED COMMON PIGWEED IGE RAST QL: 0
DEPRECATED COMMON RAGWEED IGE RAST QL: 0
DEPRECATED COTTONWOOD IGE RAST QL: 0
DEPRECATED D FARINAE IGE RAST QL: 0
DEPRECATED D PTERONYSS IGE RAST QL: 0
DEPRECATED DOG DANDER IGE RAST QL: 0
DEPRECATED GOOSEFOOT IGE RAST QL: 0
DEPRECATED LONDON PLANE IGE RAST QL: 0
DEPRECATED MOUSE URINE PROT IGE RAST QL: 0
DEPRECATED MUGWORT IGE RAST QL: 0
DEPRECATED P NOTATUM IGE RAST QL: 0
DEPRECATED RED CEDAR IGE RAST QL: 0
DEPRECATED ROACH IGE RAST QL: 0
DEPRECATED SHEEP SORREL IGE RAST QL: 0
DEPRECATED SILVER BIRCH IGE RAST QL: 0
DEPRECATED TIMOTHY IGE RAST QL: 0
DEPRECATED WHITE ASH IGE RAST QL: 0
DEPRECATED WHITE OAK IGE RAST QL: 0
DOG DANDER IGE QN: <0.1 KUA/L
GOOSEFOOT IGE QN: <0.1 KUA/L
LONDON PLANE IGE QN: <0.1 KUA/L
MOUSE URINE PROT IGE QN: <0.1 KUA/L
MUGWORT IGE QN: <0.1 KUA/L
MULBERRY (T70) CLASS: 0
MULBERRY (T70) CONC: <0.1 KUA/L
P NOTATUM IGE QN: <0.1 KUA/L
RED CEDAR IGE QN: <0.1 KUA/L
ROACH IGE QN: <0.1 KUA/L
SHEEP SORREL IGE QN: <0.1 KUA/L
SILVER BIRCH IGE QN: <0.1 KUA/L
TIMOTHY IGE QN: <0.1 KUA/L
TOTAL IGE SMQN RAST: 8 KU/L
TREE ALLERG MIX1 IGE QL: 0
WHITE ASH IGE QN: <0.1 KUA/L
WHITE ELM IGE QN: 0
WHITE ELM IGE QN: <0.1 KUA/L
WHITE OAK IGE QN: <0.1 KUA/L

## 2021-10-22 LAB
DEPRECATED KAPPA LC FREE/LAMBDA SER: 1.02 RATIO
IGA SER QL IEP: 92 MG/DL
IGG SER QL IEP: 1339 MG/DL
IGM SER QL IEP: 314 MG/DL
KAPPA LC CSF-MCNC: 1.31 MG/DL
KAPPA LC SERPL-MCNC: 1.33 MG/DL

## 2021-11-01 RX ORDER — HYOSCYAMINE SULFATE 0.38 MG/1
0.38 TABLET, EXTENDED RELEASE ORAL
Qty: 180 | Refills: 3 | Status: COMPLETED | COMMUNITY
Start: 2021-11-01 | End: 2022-10-27

## 2021-12-02 ENCOUNTER — APPOINTMENT (OUTPATIENT)
Dept: DISASTER EMERGENCY | Facility: CLINIC | Age: 51
End: 2021-12-02

## 2021-12-03 LAB — SARS-COV-2 N GENE NPH QL NAA+PROBE: NOT DETECTED

## 2021-12-06 ENCOUNTER — APPOINTMENT (OUTPATIENT)
Dept: PULMONOLOGY | Facility: CLINIC | Age: 51
End: 2021-12-06
Payer: COMMERCIAL

## 2021-12-06 ENCOUNTER — LABORATORY RESULT (OUTPATIENT)
Age: 51
End: 2021-12-06

## 2021-12-06 VITALS
BODY MASS INDEX: 27.89 KG/M2 | OXYGEN SATURATION: 97 % | TEMPERATURE: 97.7 F | HEIGHT: 68 IN | HEART RATE: 74 BPM | WEIGHT: 184 LBS

## 2021-12-06 VITALS
SYSTOLIC BLOOD PRESSURE: 122 MMHG | RESPIRATION RATE: 16 BRPM | TEMPERATURE: 98.1 F | HEIGHT: 68 IN | WEIGHT: 184 LBS | DIASTOLIC BLOOD PRESSURE: 80 MMHG | HEART RATE: 72 BPM | BODY MASS INDEX: 27.89 KG/M2

## 2021-12-06 PROCEDURE — 94726 PLETHYSMOGRAPHY LUNG VOLUMES: CPT

## 2021-12-06 PROCEDURE — 94060 EVALUATION OF WHEEZING: CPT

## 2021-12-06 PROCEDURE — ZZZZZ: CPT

## 2021-12-06 PROCEDURE — 99214 OFFICE O/P EST MOD 30 MIN: CPT | Mod: 25

## 2021-12-06 PROCEDURE — 94729 DIFFUSING CAPACITY: CPT

## 2021-12-06 NOTE — PHYSICAL EXAM
[General Appearance - Well Developed] : well developed [Normal Appearance] : normal appearance [Well Groomed] : well groomed [General Appearance - Well Nourished] : well nourished [No Deformities] : no deformities [General Appearance - In No Acute Distress] : no acute distress [Normal Conjunctiva] : the conjunctiva exhibited no abnormalities [Eyelids - No Xanthelasma] : the eyelids demonstrated no xanthelasmas [Normal Oropharynx] : normal oropharynx [Neck Appearance] : the appearance of the neck was normal [Heart Rate And Rhythm] : heart rate and rhythm were normal [Heart Sounds] : normal S1 and S2 [Murmurs] : no murmurs present [Respiration, Rhythm And Depth] : normal respiratory rhythm and effort [Exaggerated Use Of Accessory Muscles For Inspiration] : no accessory muscle use [Auscultation Breath Sounds / Voice Sounds] : lungs were clear to auscultation bilaterally [Lesions: ____] : lesions [unfilled] [Abdomen Soft] : soft [Abdomen Tenderness] : non-tender [Abdomen Mass (___ Cm)] : no abdominal mass palpated [Abnormal Walk] : normal gait [Gait - Sufficient For Exercise Testing] : the gait was sufficient for exercise testing [Skin Color & Pigmentation] : normal skin color and pigmentation [No Venous Stasis] : no venous stasis [Skin Lesions] : no skin lesions [No Skin Ulcers] : no skin ulcer [No Xanthoma] : no  xanthoma was observed [Cranial Nerves] : cranial nerves 2-12 were intact [Oriented To Time, Place, And Person] : oriented to person, place, and time [Impaired Insight] : insight and judgment were intact [Affect] : the affect was normal [Normal Oral Mucosa] : normal oral mucosa [Nail Clubbing] : no clubbing of the fingernails [Cyanosis, Localized] : no localized cyanosis [Petechial Hemorrhages (___cm)] : no petechial hemorrhages [] : no ischemic changes [FreeTextEntry1] : nares  erythematous

## 2021-12-06 NOTE — CONSULT LETTER
[Mercy Garsia MD, FCCP] : Mercy Garsia MD, FCCP [Director, Pulmonary Hypertension Program] : Director, Pulmonary Hypertension Program [[facility]] : [facility] [Division of Pulmonary, Critical Care and Sleep Medicine] : Division of Pulmonary, Critical Care and Sleep Medicine [Associate Professor of Medicine] : Associate Professor of Medicine

## 2021-12-06 NOTE — HISTORY OF PRESENT ILLNESS
[Worsened] : have worsened [Difficulty Breathing During Exertion] : worsened dyspnea on exertion [Cough] : worsened coughing [TextBox_4] : 50 yo w/untreated asthma- self referred for 2nd opinion for her bronchial asthma--------has not been on prednisone has never been intubated-- no history of fever chills rigors chest hemoptysis----------No history of , fever, chills , rigors, chestpain, or hemoptysis. Questionable history of Raynauds phenomenon. No h/o significant weight loss in last few months. No history of liver dysfunction , collagen vascular disorder or chronic thromboembolic disease. I would classify her dyspnea as WHO  FUNCTIONAL CLASS II---------------------\par PFT 2018 mild obstruction\par cxr- 2018 clear lungs\par ---------------\par ----- 5/ 2015 ---- ct chest -----------4mm left apical nodule and 3-4 m, left lingula nodule .\par h/o allergy testing w/allergy to mold-------------\par lifelong nonsmoker-------------\par \par \par sept 2019- asthma- s/p urgicenter visit last week for CP- states EKG normal. No resp complaints-- on  inhaled steroids and bronchodilators, no further exacerbations\par \par 8/2020 pft normal lung volumes\par august 2020 asthma controlled on symbicort and singulair, uses ventolin as needed\par \par \par 10/2021 asthma exacerbation- started prednisone this weekend (20mg) on nebs and symbicort, covid swab neg, afebrile\par Up to date w/covid and influenza vac\par \par 12/2021 pft normal lung volumes\par 12/2021 asthma now stable- no current complaints, using inhalers [Oxygen] : the patient uses no supplemental oxygen [FreeTextEntry1] : ---------

## 2021-12-06 NOTE — LETTER CLOSING
[Sincerely,] : Sincerely, [Mercy Garsia MD, FCCP] : Mercy Garsia MD, FCCP [Director, Pulmonary Hypertension Program] : Director, Pulmonary Hypertension Program [[facility]] : [facility] [Division of Pulmonary, Critical Care and Sleep Medicine] : Division of Pulmonary, Critical Care and Sleep Medicine [Associate Professor of Medicine] : Associate Professor of Medicine [[facility]] : [facility]

## 2021-12-06 NOTE — DISCUSSION/SUMMARY
[FreeTextEntry1] : -------------Assessment and Plan----52 yo with asthma well controlled-  continue symbicort.  CONTINUE  1)SALINE NASAL SPRAY,  Singulair,  albuterol- as needed\par 2) labs drawn today\par 3) f/u in 3-4m  \par \par \par Patient at this time  will follow  the above mentioned recommendations and return back for follow up visit. If you have any questions  I can be reached  at [phone]  or  [phone].  \par \par Mercy Garsia MD, FCCP \par Director, Pulmonary Hypertension Program \par [facility] \par Division of Pulmonary, Critical Care and Sleep Medicine \par  Professor of Medicine \par [employer]\par \par CHELSIE ChengC\par \par

## 2021-12-06 NOTE — END OF VISIT
[] : Nurse Practitioner [Time Spent: ___ minutes] : I have spent [unfilled] minutes of time on the encounter. [FreeTextEntry1] : AT

## 2021-12-07 LAB
DEPRECATED KAPPA LC FREE/LAMBDA SER: 1.34 RATIO
EOSINOPHIL # BLD MANUAL: 60 /UL
IGA SER QL IEP: 79 MG/DL
IGG SER QL IEP: 1264 MG/DL
IGM SER QL IEP: 291 MG/DL
KAPPA LC CSF-MCNC: 1.46 MG/DL
KAPPA LC SERPL-MCNC: 1.96 MG/DL

## 2021-12-09 LAB
A ALTERNATA IGE QN: <0.1 KUA/L
A FUMIGATUS IGE QN: <0.1 KUA/L
BERMUDA GRASS IGE QN: <0.1 KUA/L
BOXELDER IGE QN: <0.1 KUA/L
C HERBARUM IGE QN: <0.1 KUA/L
CALIF WALNUT IGE QN: <0.1 KUA/L
CAT DANDER IGE QN: <0.1 KUA/L
CMN PIGWEED IGE QN: <0.1 KUA/L
COMMON RAGWEED IGE QN: <0.1 KUA/L
COTTONWOOD IGE QN: <0.1 KUA/L
D FARINAE IGE QN: <0.1 KUA/L
D PTERONYSS IGE QN: <0.1 KUA/L
DEPRECATED A ALTERNATA IGE RAST QL: 0
DEPRECATED A FUMIGATUS IGE RAST QL: 0
DEPRECATED BERMUDA GRASS IGE RAST QL: 0
DEPRECATED BOXELDER IGE RAST QL: 0
DEPRECATED C HERBARUM IGE RAST QL: 0
DEPRECATED CAT DANDER IGE RAST QL: 0
DEPRECATED COMMON PIGWEED IGE RAST QL: 0
DEPRECATED COMMON RAGWEED IGE RAST QL: 0
DEPRECATED COTTONWOOD IGE RAST QL: 0
DEPRECATED D FARINAE IGE RAST QL: 0
DEPRECATED D PTERONYSS IGE RAST QL: 0
DEPRECATED DOG DANDER IGE RAST QL: 0
DEPRECATED GOOSEFOOT IGE RAST QL: 0
DEPRECATED LONDON PLANE IGE RAST QL: 0
DEPRECATED MOUSE URINE PROT IGE RAST QL: 0
DEPRECATED MUGWORT IGE RAST QL: 0
DEPRECATED P NOTATUM IGE RAST QL: 0
DEPRECATED RED CEDAR IGE RAST QL: 0
DEPRECATED ROACH IGE RAST QL: 0
DEPRECATED SHEEP SORREL IGE RAST QL: 0
DEPRECATED SILVER BIRCH IGE RAST QL: 0
DEPRECATED TIMOTHY IGE RAST QL: 0
DEPRECATED WHITE ASH IGE RAST QL: 0
DEPRECATED WHITE OAK IGE RAST QL: 0
DOG DANDER IGE QN: <0.1 KUA/L
GOOSEFOOT IGE QN: <0.1 KUA/L
LONDON PLANE IGE QN: <0.1 KUA/L
MOUSE URINE PROT IGE QN: <0.1 KUA/L
MUGWORT IGE QN: <0.1 KUA/L
MULBERRY (T70) CLASS: 0
MULBERRY (T70) CONC: <0.1 KUA/L
P NOTATUM IGE QN: <0.1 KUA/L
RED CEDAR IGE QN: <0.1 KUA/L
ROACH IGE QN: <0.1 KUA/L
SHEEP SORREL IGE QN: <0.1 KUA/L
SILVER BIRCH IGE QN: <0.1 KUA/L
TIMOTHY IGE QN: <0.1 KUA/L
TOTAL IGE SMQN RAST: 6 KU/L
TREE ALLERG MIX1 IGE QL: 0
WHITE ASH IGE QN: <0.1 KUA/L
WHITE ELM IGE QN: 0
WHITE ELM IGE QN: <0.1 KUA/L
WHITE OAK IGE QN: <0.1 KUA/L

## 2022-01-04 ENCOUNTER — TRANSCRIPTION ENCOUNTER (OUTPATIENT)
Age: 52
End: 2022-01-04

## 2022-02-08 ENCOUNTER — RX RENEWAL (OUTPATIENT)
Age: 52
End: 2022-02-08

## 2022-03-06 ENCOUNTER — TRANSCRIPTION ENCOUNTER (OUTPATIENT)
Age: 52
End: 2022-03-06

## 2022-03-30 ENCOUNTER — NON-APPOINTMENT (OUTPATIENT)
Age: 52
End: 2022-03-30

## 2022-04-05 ENCOUNTER — APPOINTMENT (OUTPATIENT)
Dept: PULMONOLOGY | Facility: CLINIC | Age: 52
End: 2022-04-05
Payer: COMMERCIAL

## 2022-04-05 PROCEDURE — 99214 OFFICE O/P EST MOD 30 MIN: CPT | Mod: 95

## 2022-04-05 NOTE — HISTORY OF PRESENT ILLNESS
[Worsened] : have worsened [Difficulty Breathing During Exertion] : worsened dyspnea on exertion [Cough] : worsened coughing [TextBox_4] : 50 yo w/untreated asthma- self referred for 2nd opinion for her bronchial asthma--------has not been on prednisone has never been intubated-- no history of fever chills rigors chest hemoptysis----------No history of , fever, chills , rigors, chestpain, or hemoptysis. Questionable history of Raynauds phenomenon. No h/o significant weight loss in last few months. No history of liver dysfunction , collagen vascular disorder or chronic thromboembolic disease. I would classify her dyspnea as WHO  FUNCTIONAL CLASS II---------------------\par PFT 2018 mild obstruction\par cxr- 2018 clear lungs\par ---------------\par ----- 5/ 2015 ---- ct chest -----------4mm left apical nodule and 3-4 m, left lingula nodule .\par h/o allergy testing w/allergy to mold-------------\par lifelong nonsmoker-------------\par \par \par sept 2019- asthma- s/p urgicenter visit last week for CP- states EKG normal. No resp complaints-- on  inhaled steroids and bronchodilators, no further exacerbations\par \par 8/2020 pft normal lung volumes\par august 2020 asthma controlled on symbicort and singulair, uses ventolin as needed\par \par \par 10/2021 asthma exacerbation- started prednisone this weekend (20mg) on nebs and symbicort, covid swab neg, afebrile\par Up to date w/covid and influenza vac\par \par 12/2021 pft normal lung volumes\par 12/2021 asthma now stable- no current complaints, using inhalers\par \par 4/2022 asthma exacerbation- self treated w/mucinex, taking nebs 4x daily. Afebrile [Home] : at home, [unfilled] , at the time of the visit. [Medical Office: ([facility])___] : at the medical office located in  [Verbal consent obtained from patient] : the patient, [unfilled] [Oxygen] : the patient uses no supplemental oxygen

## 2022-04-05 NOTE — CONSULT LETTER
[Mercy Garsia MD, FCCP] : Mercy Garsia MD, FCCP [Director, Pulmonary Hypertension Program] : Director, Pulmonary Hypertension Program [[facility]] : [facility] [Division of Pulmonary, Critical Care and Sleep Medicine] : Division of Pulmonary, Critical Care and Sleep Medicine [Associate Professor of Medicine] : Associate Professor of Medicine

## 2022-04-05 NOTE — LETTER CLOSING
[Sincerely,] : Sincerely, [Mercy Garsia MD, FCCP] : Mercy Garsia MD, FCCP [Director, Pulmonary Hypertension Program] : Director, Pulmonary Hypertension Program [[facility]] : [facility] [Division of Pulmonary, Critical Care and Sleep Medicine] : Division of Pulmonary, Critical Care and Sleep Medicine [Associate Professor of Medicine] : Associate Professor of Medicine [[facility]] : [facility]

## 2022-04-05 NOTE — END OF VISIT
[Time Spent: ___ minutes] : I have spent [unfilled] minutes of time on the encounter. [] : Nurse Practitioner [FreeTextEntry1] : AT

## 2022-04-05 NOTE — DISCUSSION/SUMMARY
[FreeTextEntry1] : -------------Assessment and Plan----50 yo with asthma well controlled-  continue symbicort.  CONTINUE  1)SALINE NASAL SPRAY,  Singulair,  albuterol- as needed\par 2) zpack and start prednisone w/taper\par 3) needs resp swab w/covid\par 4) f/u end of week if unimproved\par \par Patient at this time  will follow  the above mentioned recommendations and return back for follow up visit. If you have any questions  I can be reached  at [phone]  or  [phone].  \par \par Mercy Garsia MD, FCCP \par Director, Pulmonary Hypertension Program \par [facility] \par Division of Pulmonary, Critical Care and Sleep Medicine \par  Professor of Medicine \par [employer]\par \par BRYCE Cheng\par \par

## 2022-04-10 LAB
CORONAVIRUS (229E,HKU1,NL63,OC43): DETECTED
RAPID RVP RESULT: DETECTED
SARS-COV-2 RNA PNL RESP NAA+PROBE: NOT DETECTED

## 2022-04-11 ENCOUNTER — NON-APPOINTMENT (OUTPATIENT)
Age: 52
End: 2022-04-11

## 2022-04-15 ENCOUNTER — NON-APPOINTMENT (OUTPATIENT)
Age: 52
End: 2022-04-15

## 2022-06-01 ENCOUNTER — RX RENEWAL (OUTPATIENT)
Age: 52
End: 2022-06-01

## 2022-06-02 ENCOUNTER — RX RENEWAL (OUTPATIENT)
Age: 52
End: 2022-06-02

## 2022-07-25 ENCOUNTER — RX RENEWAL (OUTPATIENT)
Age: 52
End: 2022-07-25

## 2022-08-10 ENCOUNTER — NON-APPOINTMENT (OUTPATIENT)
Age: 52
End: 2022-08-10

## 2022-08-11 ENCOUNTER — NON-APPOINTMENT (OUTPATIENT)
Age: 52
End: 2022-08-11

## 2022-11-03 ENCOUNTER — NON-APPOINTMENT (OUTPATIENT)
Age: 52
End: 2022-11-03

## 2022-11-14 NOTE — LETTER CLOSING
[Sincerely,] : Sincerely, [Mercy Garsia MD, FCCP] : Mercy Garsia MD, FCCP [Director, Pulmonary Hypertension Program] : Director, Pulmonary Hypertension Program [[facility]] : [facility] [Division of Pulmonary, Critical Care and Sleep Medicine] : Division of Pulmonary, Critical Care and Sleep Medicine [Associate Professor of Medicine] : Associate Professor of Medicine [[facility]] : [facility]

## 2022-11-14 NOTE — CONSULT LETTER
[Mercy Garsia MD, FCCP] : Mercy Garsia MD, FCCP [Director, Pulmonary Hypertension Program] : Director, Pulmonary Hypertension Program [[facility]] : [facility] [Division of Pulmonary, Critical Care and Sleep Medicine] : Division of Pulmonary, Critical Care and Sleep Medicine [Associate Professor of Medicine] : Associate Professor of Medicine

## 2022-11-15 ENCOUNTER — APPOINTMENT (OUTPATIENT)
Dept: PULMONOLOGY | Facility: CLINIC | Age: 52
End: 2022-11-15

## 2022-11-15 VITALS
OXYGEN SATURATION: 97 % | HEIGHT: 68 IN | HEART RATE: 78 BPM | SYSTOLIC BLOOD PRESSURE: 140 MMHG | TEMPERATURE: 97.1 F | WEIGHT: 181 LBS | RESPIRATION RATE: 16 BRPM | DIASTOLIC BLOOD PRESSURE: 85 MMHG | BODY MASS INDEX: 27.43 KG/M2

## 2022-11-15 PROCEDURE — 99215 OFFICE O/P EST HI 40 MIN: CPT | Mod: 25

## 2022-11-15 PROCEDURE — 36415 COLL VENOUS BLD VENIPUNCTURE: CPT

## 2022-11-15 RX ORDER — PREDNISONE 10 MG/1
10 TABLET ORAL
Qty: 60 | Refills: 0 | Status: DISCONTINUED | COMMUNITY
Start: 2022-04-05 | End: 2022-11-15

## 2022-11-15 NOTE — PHYSICAL EXAM
[No Acute Distress] : no acute distress [Normal Appearance] : normal appearance [No Neck Mass] : no neck mass [Normal Rate/Rhythm] : normal rate/rhythm [Normal S1, S2] : normal s1, s2 [No Murmurs] : no murmurs [No Resp Distress] : no resp distress [Clear to Auscultation Bilaterally] : clear to auscultation bilaterally [No Abnormalities] : no abnormalities [Benign] : benign [Normal Gait] : normal gait [No Clubbing] : no clubbing [No Cyanosis] : no cyanosis [No Edema] : no edema [FROM] : FROM [Normal Color/ Pigmentation] : normal color/ pigmentation [No Focal Deficits] : no focal deficits [Oriented x3] : oriented x3 [Normal Affect] : normal affect [Normal Oropharynx] : abnormal oropharynx

## 2022-11-15 NOTE — HISTORY OF PRESENT ILLNESS
[Home] : at home, [unfilled] , at the time of the visit. [Medical Office: ([facility])___] : at the medical office located in  [Verbal consent obtained from patient] : the patient, [unfilled] [Worsened] : have worsened [Difficulty Breathing During Exertion] : worsened dyspnea on exertion [Cough] : worsened coughing [TextBox_4] : 50 yo w/untreated asthma- self referred for 2nd opinion for her bronchial asthma--------has not been on prednisone has never been intubated-- no history of fever chills rigors chest hemoptysis----------No history of , fever, chills , rigors, chestpain, or hemoptysis. Questionable history of Raynauds phenomenon. No h/o significant weight loss in last few months. No history of liver dysfunction , collagen vascular disorder or chronic thromboembolic disease. I would classify her dyspnea as WHO  FUNCTIONAL CLASS II---------------------\par PFT 2018 mild obstruction\par cxr- 2018 clear lungs\par ---------------\par ----- 5/ 2015 ---- ct chest -----------4mm left apical nodule and 3-4 m, left lingula nodule .\par h/o allergy testing w/allergy to mold-------------\par lifelong nonsmoker-------------\par \par \par sept 2019- asthma- s/p urgicenter visit last week for CP- states EKG normal. No resp complaints-- on  inhaled steroids and bronchodilators, no further exacerbations\par \par 8/2020 pft normal lung volumes\par august 2020 asthma controlled on symbicort and singulair, uses ventolin as needed\par \par \par 10/2021 asthma exacerbation- started prednisone this weekend (20mg) on nebs and symbicort, covid swab neg, afebrile\par Up to date w/covid and influenza vac\par \par 12/2021 pft normal lung volumes\par 12/2021 asthma now stable- no current complaints, using inhalers\par \par 4/2022 asthma exacerbation- self treated w/mucinex, taking nebs 4x daily. Afebrile\par \par \par 11/2022 asthma stable , off inhalers for now\par last exacerbation was early october\par UTD w/covid and influenza vac [Oxygen] : the patient uses no supplemental oxygen

## 2022-11-16 LAB
25(OH)D3 SERPL-MCNC: 61.9 NG/ML
ALBUMIN SERPL ELPH-MCNC: 4.2 G/DL
ALP BLD-CCNC: 74 U/L
ALT SERPL-CCNC: 16 U/L
ANION GAP SERPL CALC-SCNC: 10 MMOL/L
AST SERPL-CCNC: 21 U/L
BASOPHILS # BLD AUTO: 0.03 K/UL
BASOPHILS NFR BLD AUTO: 0.5 %
BILIRUB SERPL-MCNC: 0.5 MG/DL
BUN SERPL-MCNC: 5 MG/DL
CALCIUM SERPL-MCNC: 8.8 MG/DL
CHLORIDE SERPL-SCNC: 101 MMOL/L
CO2 SERPL-SCNC: 23 MMOL/L
CREAT SERPL-MCNC: 0.85 MG/DL
DEPRECATED KAPPA LC FREE/LAMBDA SER: 1.03 RATIO
EGFR: 83 ML/MIN/1.73M2
EOSINOPHIL # BLD AUTO: 0.03 K/UL
EOSINOPHIL # BLD MANUAL: 30 /UL
EOSINOPHIL NFR BLD AUTO: 0.5 %
FOLATE RBC-MCNC: 1379 NG/ML
GLUCOSE SERPL-MCNC: 81 MG/DL
HCT VFR BLD CALC: 37.7 %
HCT VFR BLD CALC: 37.7 %
HGB BLD-MCNC: 12.8 G/DL
IGA SER QL IEP: 68 MG/DL
IGG SER QL IEP: 1075 MG/DL
IGM SER QL IEP: 268 MG/DL
IMM GRANULOCYTES NFR BLD AUTO: 0.2 %
KAPPA LC CSF-MCNC: 2.01 MG/DL
KAPPA LC SERPL-MCNC: 2.08 MG/DL
LYMPHOCYTES # BLD AUTO: 1.28 K/UL
LYMPHOCYTES NFR BLD AUTO: 22.6 %
MAN DIFF?: NORMAL
MCHC RBC-ENTMCNC: 30.2 PG
MCHC RBC-ENTMCNC: 34 GM/DL
MCV RBC AUTO: 88.9 FL
MONOCYTES # BLD AUTO: 0.49 K/UL
MONOCYTES NFR BLD AUTO: 8.6 %
NEUTROPHILS # BLD AUTO: 3.83 K/UL
NEUTROPHILS NFR BLD AUTO: 67.6 %
PLATELET # BLD AUTO: 309 K/UL
POTASSIUM SERPL-SCNC: 4.4 MMOL/L
PROT SERPL-MCNC: 6.7 G/DL
RBC # BLD: 4.24 M/UL
RBC # FLD: 13.4 %
SODIUM SERPL-SCNC: 134 MMOL/L
VIT B12 SERPL-MCNC: >2000 PG/ML
WBC # FLD AUTO: 5.67 K/UL

## 2022-11-17 LAB — TOTAL IGE SMQN RAST: 5 KU/L

## 2022-12-01 ENCOUNTER — RX RENEWAL (OUTPATIENT)
Age: 52
End: 2022-12-01

## 2022-12-19 ENCOUNTER — APPOINTMENT (OUTPATIENT)
Dept: PULMONOLOGY | Facility: CLINIC | Age: 52
End: 2022-12-19

## 2022-12-19 PROCEDURE — 99214 OFFICE O/P EST MOD 30 MIN: CPT | Mod: 95

## 2022-12-19 NOTE — CONSULT LETTER
[Mercy Garsia MD, FCCP] : Mercy Garsia MD, FCCP [Director, Pulmonary Hypertension Program] : Director, Pulmonary Hypertension Program [[facility]] : [facility] [Division of Pulmonary, Critical Care and Sleep Medicine] : Division of Pulmonary, Critical Care and Sleep Medicine [Associate Professor of Medicine] : Associate Professor of Medicine

## 2022-12-19 NOTE — DISCUSSION/SUMMARY
[FreeTextEntry1] : -------------Assessment and Plan----51 yo with asthma well controlled-  continue symbicort.  CONTINUE  Now covid positive\par \par 1) nasal sprays and mucinex\par 2) levaquin (allergic to zpack)\par 3) prednisone 10mg\par 4) maintain hydration\par 5) using symbicort\par 6) declined paxlovid as she is having stomach upset already\par 7) will update by thursday on how she is doing\par 8) utd with vaccines\par \par \par Patient at this time  will follow  the above mentioned recommendations and return back for follow up visit. If you have any questions  I can be reached  at [phone]  or  [phone].  \par \par Mercy Garsia MD, FCCP \par Director, Pulmonary Hypertension Program \par [facility] \par Division of Pulmonary, Critical Care and Sleep Medicine \par  Professor of Medicine \par [employer]\par \par CHE Cheng-C\par \par

## 2022-12-19 NOTE — HISTORY OF PRESENT ILLNESS
[Home] : at home, [unfilled] , at the time of the visit. [Medical Office: ([facility])___] : at the medical office located in  [Verbal consent obtained from patient] : the patient, [unfilled] [Worsened] : have worsened [Difficulty Breathing During Exertion] : worsened dyspnea on exertion [Cough] : worsened coughing [Never] : never [TextBox_4] : 52 yo w/untreated asthma- self referred for 2nd opinion for her bronchial asthma--------has not been on prednisone has never been intubated-- no history of fever chills rigors chest hemoptysis----------No history of , fever, chills , rigors, chestpain, or hemoptysis. Questionable history of Raynauds phenomenon. No h/o significant weight loss in last few months. No history of liver dysfunction , collagen vascular disorder or chronic thromboembolic disease. I would classify her dyspnea as WHO  FUNCTIONAL CLASS II---------------------\par PFT 2018 mild obstruction\par cxr- 2018 clear lungs\par ---------------\par ----- 5/ 2015 ---- ct chest -----------4mm left apical nodule and 3-4 m, left lingula nodule .\par h/o allergy testing w/allergy to mold-------------\par lifelong nonsmoker-------------\par \par \par sept 2019- asthma- s/p urgicenter visit last week for CP- states EKG normal. No resp complaints-- on  inhaled steroids and bronchodilators, no further exacerbations\par \par 8/2020 pft normal lung volumes\par august 2020 asthma controlled on symbicort and singulair, uses ventolin as needed\par \par \par 10/2021 asthma exacerbation- started prednisone this weekend (20mg) on nebs and symbicort, covid swab neg, afebrile\par Up to date w/covid and influenza vac\par \par 12/2021 pft normal lung volumes\par 12/2021 asthma now stable- no current complaints, using inhalers\par \par 4/2022 asthma exacerbation- self treated w/mucinex, taking nebs 4x daily. Afebrile\par \par \par 11/2022 asthma stable , off inhalers for now\par last exacerbation was early october\par UTD w/covid and influenza vac\par \par \par 12/19/2022 teledoc appt- no apparent distress\par tested positive for covid 12/19\par having nasal congestion, afebrile, mild wheezing, o2 hussain 99% room air\par UTD w/covid vac & boosters [Oxygen] : the patient uses no supplemental oxygen

## 2022-12-20 ENCOUNTER — NON-APPOINTMENT (OUTPATIENT)
Age: 52
End: 2022-12-20

## 2022-12-22 ENCOUNTER — APPOINTMENT (OUTPATIENT)
Dept: PULMONOLOGY | Facility: CLINIC | Age: 52
End: 2022-12-22

## 2022-12-22 DIAGNOSIS — U07.1 COVID-19: ICD-10-CM

## 2022-12-22 PROCEDURE — 99214 OFFICE O/P EST MOD 30 MIN: CPT | Mod: 95

## 2022-12-22 NOTE — HISTORY OF PRESENT ILLNESS
[Home] : at home, [unfilled] , at the time of the visit. [Medical Office: ([facility])___] : at the medical office located in  [Verbal consent obtained from patient] : the patient, [unfilled] [Never] : never [Worsened] : have worsened [Difficulty Breathing During Exertion] : worsened dyspnea on exertion [Cough] : worsened coughing [TextBox_4] : 52 yo w/untreated asthma- self referred for 2nd opinion for her bronchial asthma--------has not been on prednisone has never been intubated-- no history of fever chills rigors chest hemoptysis----------No history of , fever, chills , rigors, chestpain, or hemoptysis. Questionable history of Raynauds phenomenon. No h/o significant weight loss in last few months. No history of liver dysfunction , collagen vascular disorder or chronic thromboembolic disease. I would classify her dyspnea as WHO  FUNCTIONAL CLASS II---------------------\par PFT 2018 mild obstruction\par cxr- 2018 clear lungs\par ---------------\par ----- 5/ 2015 ---- ct chest -----------4mm left apical nodule and 3-4 m, left lingula nodule .\par h/o allergy testing w/allergy to mold-------------\par lifelong nonsmoker-------------\par \par \par sept 2019- asthma- s/p urgicenter visit last week for CP- states EKG normal. No resp complaints-- on  inhaled steroids and bronchodilators, no further exacerbations\par \par 8/2020 pft normal lung volumes\par august 2020 asthma controlled on symbicort and singulair, uses ventolin as needed\par \par \par 10/2021 asthma exacerbation- started prednisone this weekend (20mg) on nebs and symbicort, covid swab neg, afebrile\par Up to date w/covid and influenza vac\par \par 12/2021 pft normal lung volumes\par 12/2021 asthma now stable- no current complaints, using inhalers\par \par 4/2022 asthma exacerbation- self treated w/mucinex, taking nebs 4x daily. Afebrile\par \par \par 11/2022 asthma stable , off inhalers for now\par last exacerbation was early october\par UTD w/covid and influenza vac\par \par \par 12/19/2022 teledoc appt- no apparent distress\par tested positive for covid 12/19\par having nasal congestion, afebrile, mild wheezing, o2 hussain 99% room air\par UTD w/covid vac & boosters\par \par \par 12/22 teledoc appt- recovering from covid- still w/some coughing\par on pred and levaquin, using inhalers, afebrile\par  [Oxygen] : the patient uses no supplemental oxygen

## 2022-12-22 NOTE — CONSULT LETTER
[Mercy Garsia MD, FCCP] : Mercy Garsia MD, FCCP [Director, Pulmonary Hypertension Program] : Director, Pulmonary Hypertension Program [[facility]] : [facility] [Division of Pulmonary, Critical Care and Sleep Medicine] : Division of Pulmonary, Critical Care and Sleep Medicine [Associate Professor of Medicine] : Associate Professor of Medicine

## 2022-12-22 NOTE — DISCUSSION/SUMMARY
[FreeTextEntry1] : -------------Assessment and Plan----53 yo with asthma well controlled-  continue symbicort.  CONTINUE  Now covid positive  12/ 17 / 2022 --\par \par 1) nasal sprays and mucinex\par 2) levaquin (allergic to zpack)\par 3) prednisone 10mg\par 4) maintain hydration\par 5) using symbicort\par 6) declined paxlovid as she is having stomach upset already\par 7)   F./U IN 2 WEEKS  TIME \par 8) utd with vaccines\par \par \par Patient at this time  will follow  the above mentioned recommendations and return back for follow up visit. If you have any questions  I can be reached  at [phone]  or  [phone].  \par \par Mercy Garsia MD, FCCP \par Director, Pulmonary Hypertension Program \par [facility] \par Division of Pulmonary, Critical Care and Sleep Medicine \par  Professor of Medicine \par [employer]\par \par CHE Cheng-C\par \par

## 2023-02-22 RX ORDER — LINACLOTIDE 145 UG/1
145 CAPSULE, GELATIN COATED ORAL
Qty: 90 | Refills: 1 | Status: ACTIVE | COMMUNITY
Start: 2022-02-08 | End: 1900-01-01

## 2023-03-08 ENCOUNTER — RX RENEWAL (OUTPATIENT)
Age: 53
End: 2023-03-08

## 2023-03-09 RX ORDER — BUDESONIDE AND FORMOTEROL FUMARATE DIHYDRATE 80; 4.5 UG/1; UG/1
80-4.5 AEROSOL RESPIRATORY (INHALATION) TWICE DAILY
Qty: 3 | Refills: 1 | Status: DISCONTINUED | COMMUNITY
Start: 2017-08-13 | End: 2023-03-09

## 2023-04-01 ENCOUNTER — NON-APPOINTMENT (OUTPATIENT)
Age: 53
End: 2023-04-01

## 2023-04-17 ENCOUNTER — APPOINTMENT (OUTPATIENT)
Dept: GASTROENTEROLOGY | Facility: CLINIC | Age: 53
End: 2023-04-17
Payer: COMMERCIAL

## 2023-04-17 VITALS
OXYGEN SATURATION: 97 % | BODY MASS INDEX: 28.34 KG/M2 | SYSTOLIC BLOOD PRESSURE: 120 MMHG | TEMPERATURE: 96.9 F | WEIGHT: 187 LBS | HEIGHT: 68 IN | HEART RATE: 73 BPM | DIASTOLIC BLOOD PRESSURE: 80 MMHG

## 2023-04-17 DIAGNOSIS — K21.9 GASTRO-ESOPHAGEAL REFLUX DISEASE W/OUT ESOPHAGITIS: ICD-10-CM

## 2023-04-17 DIAGNOSIS — K58.9 IRRITABLE BOWEL SYNDROME W/OUT DIARRHEA: ICD-10-CM

## 2023-04-17 DIAGNOSIS — N80.9 ENDOMETRIOSIS, UNSPECIFIED: ICD-10-CM

## 2023-04-17 DIAGNOSIS — J30.9 ALLERGIC RHINITIS, UNSPECIFIED: ICD-10-CM

## 2023-04-17 DIAGNOSIS — Z86.79 PERSONAL HISTORY OF OTHER DISEASES OF THE CIRCULATORY SYSTEM: ICD-10-CM

## 2023-04-17 PROCEDURE — 99203 OFFICE O/P NEW LOW 30 MIN: CPT

## 2023-04-17 PROCEDURE — 99213 OFFICE O/P EST LOW 20 MIN: CPT

## 2023-04-17 NOTE — HISTORY OF PRESENT ILLNESS
[FreeTextEntry1] : I saw patient Chelle Healy in the office today.  The patient is a 52-year-old female with a history of hypothyroidism.  Patient is also under the care of a pulmonologist for hypersensitive airways.  Chelle denies a history of diabetes or coronary artery disease.  Patient's appetite is generally good with no dysphagia or unexplained weight loss.  The patient has a longstanding history of gastroesophageal reflux issues.  When she attempts to decrease or stop her proton pump inhibitor she gets significant breakthrough.  There has been no dysphagia or unexplained weight loss.  Patient's bowel movements are sluggish and she has suffered from constipation for most of her life.  Patient is currently taking Linzess and states that she has a bowel movement 5-6 times a week.  The patient has a sensation of complete evacuation.  There is no blood in the stool or on the toilet tissue.  The patient had an endoscopy and colonoscopy done in 2011 by another physician.  The patient is due for repeat exams.  Chelle up-to-date on her gynecological examinations.  The patient consumes 3-4 servings of caffeine a day, no ethanol and she does not smoke.

## 2023-04-17 NOTE — ASSESSMENT
[FreeTextEntry1] : The patient is a 52-year-old female who has been seen for a number of years.  The patient has reflux and apparently had an endoscopy done in the past which revealed a hiatus hernia and esophagitis. Chelle is dependent on acid reducing medication and is aware of the long-term side effects.  We will continue the  current treatment .  The patient also has chronic constipation on the basis of bowel hypomotility.  She is due for repeat colonoscopy and we will perform an upper endoscopy at the same time to rule out occult acid damage.  Once the procedures are performed I will distribute a copy of the results.  The patient will continue Linzess at the current dosage.

## 2023-04-17 NOTE — PHYSICAL EXAM
[Alert] : alert [Healthy Appearing] : healthy appearing [No Acute Distress] : no acute distress [No Respiratory Distress] : no respiratory distress [Auscultation Breath Sounds / Voice Sounds] : lungs were clear to auscultation bilaterally [Normal] : heart rate was normal and rhythm regular, normal S1 and S2, no murmurs [Abdomen Tenderness] : non-tender [Bowel Sounds] : normal bowel sounds [Abdomen Soft] : soft [] : no hepatosplenomegaly

## 2023-04-17 NOTE — REVIEW OF SYSTEMS
[Fever] : no fever [Chills] : no chills [Feeling Tired] : not feeling tired [Recent Weight Loss (___ Lbs)] : no recent weight loss [As Noted in HPI] : as noted in HPI [Abdominal Pain] : no abdominal pain [Vomiting] : no vomiting [Constipation] : constipation [Heartburn] : heartburn [Melena (black stool)] : no melena [Bloating (gassiness)] : no bloating [Negative] : Cardiovascular

## 2023-07-24 ENCOUNTER — APPOINTMENT (OUTPATIENT)
Dept: PULMONOLOGY | Facility: CLINIC | Age: 53
End: 2023-07-24
Payer: COMMERCIAL

## 2023-07-24 VITALS
SYSTOLIC BLOOD PRESSURE: 133 MMHG | BODY MASS INDEX: 28.04 KG/M2 | HEIGHT: 68 IN | HEART RATE: 65 BPM | WEIGHT: 185 LBS | DIASTOLIC BLOOD PRESSURE: 75 MMHG

## 2023-07-24 PROCEDURE — 94729 DIFFUSING CAPACITY: CPT

## 2023-07-24 PROCEDURE — 94010 BREATHING CAPACITY TEST: CPT

## 2023-07-24 PROCEDURE — 94726 PLETHYSMOGRAPHY LUNG VOLUMES: CPT

## 2023-07-24 PROCEDURE — ZZZZZ: CPT

## 2023-07-24 PROCEDURE — 99214 OFFICE O/P EST MOD 30 MIN: CPT | Mod: 25

## 2023-07-24 NOTE — HISTORY OF PRESENT ILLNESS
[Never] : never [Worsened] : have worsened [Difficulty Breathing During Exertion] : worsened dyspnea on exertion [Cough] : worsened coughing [TextBox_4] : 52 yo w/untreated asthma- self referred for 2nd opinion for her bronchial asthma--------has not been on prednisone has never been intubated-- no history of fever chills rigors chest hemoptysis----------No history of , fever, chills , rigors, chestpain, or hemoptysis. Questionable history of Raynauds phenomenon. No h/o significant weight loss in last few months. No history of liver dysfunction , collagen vascular disorder or chronic thromboembolic disease. I would classify her dyspnea as WHO  FUNCTIONAL CLASS II---------------------\par PFT 2018 mild obstruction\par cxr- 2018 clear lungs\par ---------------\par ----- 5/ 2015 ---- ct chest -----------4mm left apical nodule and 3-4 m, left lingula nodule .\par h/o allergy testing w/allergy to mold-------------\par lifelong nonsmoker-------------\par \par \par sept 2019- asthma- s/p urgicenter visit last week for CP- states EKG normal. No resp complaints-- on  inhaled steroids and bronchodilators, no further exacerbations\par \par 8/2020 pft normal lung volumes\par august 2020 asthma controlled on symbicort and singulair, uses ventolin as needed\par \par \par 10/2021 asthma exacerbation- started prednisone this weekend (20mg) on nebs and symbicort, covid swab neg, afebrile\par Up to date w/covid and influenza vac\par \par 12/2021 pft normal lung volumes\par 12/2021 asthma now stable- no current complaints, using inhalers\par \par 4/2022 asthma exacerbation- self treated w/mucinex, taking nebs 4x daily. Afebrile\par \par \par 11/2022 asthma stable , off inhalers for now\par last exacerbation was early october\par UTD w/covid and influenza vac\par \par \par 12/19/2022 teledoc appt- no apparent distress\par tested positive for covid 12/19\par having nasal congestion, afebrile, mild wheezing, o2 hussain 99% room air\par UTD w/covid vac & boosters\par \par \par 12/22 teledoc appt- recovering from covid- still w/some coughing\par on pred and levaquin, using inhalers, afebrile\par \par 7/2023 PFT- normal lung volumes\par 7/2023\par asthma stable, on inhalers, no pulm complaints\par  [Oxygen] : the patient uses no supplemental oxygen

## 2023-07-24 NOTE — DISCUSSION/SUMMARY
2/2 to CHF  Cont diuresis      [FreeTextEntry1] : -------------Assessment and Plan----51 yo with asthma well controlled-  continue symbicort.  CONTINUE  Now covid positive  12/ 17 / 2022 --\par \par 1) nasal sprays as needed\par 2)  using symbicort\par 3) f/u in 6m\par 4) annual pft\par 5 ENT AND  ALLERGY IMMUNOLOGY EVALUATION  FOR   ALLERGIES   [ ALLERGIC TO PERFUMES]\par \par F/U 6 MONTHS \par \par Patient at this time  will follow  the above mentioned recommendations and return back for follow up visit. If you have any questions  I can be reached  at #  [phone].  \par \par Mercy Garsia MD, [facility] \par \par

## 2023-07-24 NOTE — END OF VISIT
[] : Nurse Practitioner [Time Spent: ___ minutes] : I have spent [unfilled] minutes of time on the encounter. [FreeTextEntry3] : \par  I, Dr. Mercy Garsia  personally performed the evaluation and management (E/M) services for this established patient who presents today with (a) new problem(s)/exacerbation of (an) existing condition(s). That E/M includes conducting the clinically appropriate interval history &/or exam, assessing all new/exacerbated conditions, and establishing a new plan of care. Today, my TACO, Madhuri Newton NP, , was here to observe my evaluation and management service for this new problem/exacerbated condition and follow the plan of care established by me going forward.\par  [FreeTextEntry1] : AT

## 2023-07-27 ENCOUNTER — NON-APPOINTMENT (OUTPATIENT)
Age: 53
End: 2023-07-27

## 2023-08-04 ENCOUNTER — APPOINTMENT (OUTPATIENT)
Dept: OTOLARYNGOLOGY | Facility: CLINIC | Age: 53
End: 2023-08-04
Payer: COMMERCIAL

## 2023-08-04 ENCOUNTER — NON-APPOINTMENT (OUTPATIENT)
Age: 53
End: 2023-08-04

## 2023-08-04 VITALS
SYSTOLIC BLOOD PRESSURE: 146 MMHG | HEIGHT: 68 IN | BODY MASS INDEX: 27.89 KG/M2 | TEMPERATURE: 97.2 F | WEIGHT: 184.03 LBS | DIASTOLIC BLOOD PRESSURE: 85 MMHG | HEART RATE: 60 BPM

## 2023-08-04 PROCEDURE — 31575 DIAGNOSTIC LARYNGOSCOPY: CPT

## 2023-08-04 PROCEDURE — 99204 OFFICE O/P NEW MOD 45 MIN: CPT | Mod: 25

## 2023-08-04 NOTE — PROCEDURE
[de-identified] : Flexible scope #2 used. Passed through nasal passage and nasopharynx/oropharynx clear. Hypopharynx with diffuse candidiasis in both pyriform sinuses R>L. Supraglottis normal. Glottis with fully mobile vocal cords without lesions or masses with one spot of candida posterior to right vocal process. Visualized subglottis clear. Postcricoid area without erythema or edema. No pooling of secretions.

## 2023-08-04 NOTE — ASSESSMENT
[FreeTextEntry1] : Ms. ANN 53 year F w/ hx of asthma complains smells trigger her asthma. Uses inhalers, Flonase and saline mist.   Laryngeal candidiasis: - Rx: Diflucan, take 2 the first day then 1 days 2-10 (LFTs last lab WNL) - Advised to swish and spit then gargle with water then drink the water after using inhalers   Suspect vocal cord dysfunction triggered by odors: - speech therapy eval and treatment  PND/asthma: - Has an apt with Allergist, advised to see if she can get a sooner apt  - Continue with Flonase, Ipratropium and saline spray, if symptoms worsen can add Azelastine to nasal regimen - Follow up in 4-6 weeks for reevaluation

## 2023-08-04 NOTE — HISTORY OF PRESENT ILLNESS
[de-identified] : Patient with hx of asthma complains that smells trigger her asthma. Feels like her throat is closing in on her. She was evaluated by her pulmonologist he told her to continue using her nebulizer and nasal sprays. She uses Flonase and saline mist in AM. She sees an acupuncturist to help with her asthma. Has an apt with Allergy in December.  She uses Ipratropium for PND at night which helps.

## 2023-08-04 NOTE — CONSULT LETTER
[Dear  ___] : Dear  [unfilled], [Consult Letter:] : I had the pleasure of evaluating your patient, [unfilled]. [Please see my note below.] : Please see my note below. [Consult Closing:] : Thank you very much for allowing me to participate in the care of this patient.  If you have any questions, please do not hesitate to contact me. [Sincerely,] : Sincerely, [FreeTextEntry3] : Li Peña MD Otolaryngology and Cranial Base Surgery Attending Physician - Department of Otolaryngology and Head & Neck Surgery [facility]  [employer] at [facility]

## 2023-08-04 NOTE — END OF VISIT
[FreeTextEntry3] : I personally saw and examined Ms. CHANO ANN  in detail this visit today. I personally reviewed the HPI, PMH, FH, SH, ROS and medications/allergies. I have spoken to ADI Chavez regarding the history and have personally determined the assessment and plan of care, and documented this myself. I was present and participated in all key portions of the encounter and all procedures noted above. I have made changes in the body of the note where appropriate.  Attesting Faculty: See Attending Signature Below

## 2023-08-23 ENCOUNTER — APPOINTMENT (OUTPATIENT)
Dept: GASTROENTEROLOGY | Facility: AMBULATORY MEDICAL SERVICES | Age: 53
End: 2023-08-23
Payer: COMMERCIAL

## 2023-08-23 PROCEDURE — 43239 EGD BIOPSY SINGLE/MULTIPLE: CPT | Mod: 59

## 2023-08-23 PROCEDURE — 45378 DIAGNOSTIC COLONOSCOPY: CPT | Mod: 33

## 2023-09-04 ENCOUNTER — RX RENEWAL (OUTPATIENT)
Age: 53
End: 2023-09-04

## 2023-09-30 NOTE — ED PROVIDER NOTE - CROS ED SKIN ALL NEG
Patient: Parker Acevedo    Procedure: Procedure(s):  Laparotomy exploratory, reduction of intussusception, resection of small bowel with primary anastamosis, upper endoscopy       Diagnosis: * No pre-op diagnosis entered *  Diagnosis Additional Information: No value filed.    Anesthesia Type:   General     Note:    Oropharynx: oropharynx clear of all foreign objects and spontaneously breathing  Level of Consciousness: drowsy  Oxygen Supplementation: face mask  Level of Supplemental Oxygen (L/min / FiO2): 6  Independent Airway: airway patency satisfactory and stable  Dentition: dentition unchanged  Vital Signs Stable: post-procedure vital signs reviewed and stable  Report to RN Given: handoff report given  Patient transferred to: PACU    Handoff Report: Identifed the Patient, Identified the Reponsible Provider, Reviewed the pertinent medical history, Discussed the surgical course, Reviewed Intra-OP anesthesia mangement and issues during anesthesia, Set expectations for post-procedure period and Allowed opportunity for questions and acknowledgement of understanding      Vitals:  Vitals Value Taken Time   BP 92/66 09/30/23 1145   Temp     Pulse 92 09/30/23 1149   Resp 18 09/30/23 1149   SpO2 93 % 09/30/23 1149   Vitals shown include unvalidated device data.    Electronically Signed By: SAILAJA Olivera CRNA  September 30, 2023  11:50 AM  
negative...

## 2023-10-02 ENCOUNTER — APPOINTMENT (OUTPATIENT)
Dept: OTOLARYNGOLOGY | Facility: CLINIC | Age: 53
End: 2023-10-02
Payer: COMMERCIAL

## 2023-10-02 VITALS
DIASTOLIC BLOOD PRESSURE: 89 MMHG | HEIGHT: 67 IN | WEIGHT: 186 LBS | SYSTOLIC BLOOD PRESSURE: 136 MMHG | BODY MASS INDEX: 29.19 KG/M2 | OXYGEN SATURATION: 98 % | HEART RATE: 67 BPM

## 2023-10-02 DIAGNOSIS — R09.81 NASAL CONGESTION: ICD-10-CM

## 2023-10-02 DIAGNOSIS — J38.3 OTHER DISEASES OF VOCAL CORDS: ICD-10-CM

## 2023-10-02 DIAGNOSIS — B37.89 OTHER SITES OF CANDIDIASIS: ICD-10-CM

## 2023-10-02 DIAGNOSIS — R09.82 POSTNASAL DRIP: ICD-10-CM

## 2023-10-02 PROCEDURE — 99213 OFFICE O/P EST LOW 20 MIN: CPT | Mod: 25

## 2023-10-02 PROCEDURE — 31231 NASAL ENDOSCOPY DX: CPT

## 2023-10-02 PROCEDURE — 92520 LARYNGEAL FUNCTION STUDIES: CPT | Mod: GN

## 2023-10-02 PROCEDURE — 92524 BEHAVRAL QUALIT ANALYS VOICE: CPT | Mod: GN

## 2023-10-02 RX ORDER — FLUCONAZOLE 100 MG/1
100 TABLET ORAL
Qty: 11 | Refills: 0 | Status: COMPLETED | COMMUNITY
Start: 2023-08-04 | End: 2023-10-02

## 2023-10-02 RX ORDER — LINACLOTIDE 290 UG/1
290 CAPSULE, GELATIN COATED ORAL
Qty: 90 | Refills: 3 | Status: COMPLETED | COMMUNITY
Start: 2022-03-07 | End: 2023-10-02

## 2023-10-02 RX ORDER — AZELASTINE HYDROCHLORIDE 137 UG/1
0.1 SPRAY, METERED NASAL TWICE DAILY
Qty: 1 | Refills: 11 | Status: COMPLETED | COMMUNITY
Start: 2019-09-23 | End: 2023-10-02

## 2023-10-02 RX ORDER — PREDNISONE 10 MG/1
10 TABLET ORAL DAILY
Qty: 14 | Refills: 0 | Status: COMPLETED | COMMUNITY
Start: 2022-12-19 | End: 2023-10-02

## 2023-10-02 RX ORDER — OMEPRAZOLE 20 MG/1
20 CAPSULE, DELAYED RELEASE ORAL
Qty: 180 | Refills: 3 | Status: COMPLETED | COMMUNITY
Start: 2021-08-19 | End: 2023-10-02

## 2023-10-02 RX ORDER — LEVOFLOXACIN 500 MG/1
500 TABLET, FILM COATED ORAL DAILY
Qty: 7 | Refills: 0 | Status: COMPLETED | COMMUNITY
Start: 2022-04-05 | End: 2023-10-02

## 2023-10-02 RX ORDER — PREDNISONE 5 MG/1
5 TABLET ORAL DAILY
Qty: 30 | Refills: 0 | Status: COMPLETED | COMMUNITY
Start: 2021-10-17 | End: 2023-10-02

## 2023-10-02 RX ORDER — BUDESONIDE AND FORMOTEROL FUMARATE DIHYDRATE 160; 4.5 UG/1; UG/1
160-4.5 AEROSOL RESPIRATORY (INHALATION) TWICE DAILY
Qty: 3 | Refills: 1 | Status: COMPLETED | COMMUNITY
Start: 2021-12-06 | End: 2023-10-02

## 2023-10-02 RX ORDER — LEVOFLOXACIN 500 MG/1
500 TABLET, FILM COATED ORAL DAILY
Qty: 7 | Refills: 0 | Status: COMPLETED | COMMUNITY
Start: 2022-12-19 | End: 2023-10-02

## 2023-10-02 RX ORDER — LEVOFLOXACIN 500 MG/1
500 TABLET, FILM COATED ORAL DAILY
Qty: 7 | Refills: 0 | Status: COMPLETED | COMMUNITY
Start: 2020-03-23 | End: 2023-10-02

## 2023-12-20 ENCOUNTER — APPOINTMENT (OUTPATIENT)
Dept: PEDIATRIC ALLERGY IMMUNOLOGY | Facility: CLINIC | Age: 53
End: 2023-12-20
Payer: COMMERCIAL

## 2023-12-20 VITALS
HEART RATE: 74 BPM | WEIGHT: 172.31 LBS | OXYGEN SATURATION: 98 % | DIASTOLIC BLOOD PRESSURE: 88 MMHG | SYSTOLIC BLOOD PRESSURE: 146 MMHG | HEIGHT: 67 IN | BODY MASS INDEX: 27.04 KG/M2

## 2023-12-20 DIAGNOSIS — J31.0 CHRONIC RHINITIS: ICD-10-CM

## 2023-12-20 DIAGNOSIS — J30.0 VASOMOTOR RHINITIS: ICD-10-CM

## 2023-12-20 DIAGNOSIS — T78.1XXA OTHER ADVERSE FOOD REACTIONS, NOT ELSEWHERE CLASSIFIED, INITIAL ENCOUNTER: ICD-10-CM

## 2023-12-20 PROCEDURE — 99204 OFFICE O/P NEW MOD 45 MIN: CPT | Mod: 25

## 2023-12-20 PROCEDURE — 95004 PERQ TESTS W/ALRGNC XTRCS: CPT

## 2023-12-20 RX ORDER — AZELASTINE HYDROCHLORIDE 137 UG/1
137 SPRAY, METERED NASAL TWICE DAILY
Qty: 1 | Refills: 2 | Status: ACTIVE | COMMUNITY
Start: 2023-12-20 | End: 1900-01-01

## 2023-12-21 PROBLEM — J31.0 NON-ALLERGIC RHINITIS: Status: ACTIVE | Noted: 2023-12-21

## 2023-12-21 NOTE — IMPRESSION
[Allergy Testing Dust Mite] : dust mites [Allergy Testing Mixed Feathers] : feathers [Allergy Testing Dog] : dog [Allergy Testing Cockroach] : cockroach [Allergy Testing Cat] : cat [Allergy Testing Trees] : trees [Allergy Testing Weeds] : weeds [Allergy Testing Grasses] : grasses [] : shellfish

## 2023-12-22 NOTE — CONSULT LETTER
[Dear  ___] : Dear  [unfilled], [Consult Letter:] : I had the pleasure of evaluating your patient, [unfilled]. [Please see my note below.] : Please see my note below. [Consult Closing:] : Thank you very much for allowing me to participate in the care of this patient.  If you have any questions, please do not hesitate to contact me. [Sincerely,] : Sincerely, [DrPinky  ___] : Dr. CAO [FreeTextEntry2] : Dr. Fara Leigh  [FreeTextEntry3] : Kim Dennison MD, FAAAAI, FACVAISHNAVI Associate ,  Director, [facility] and [facility] Division of Allergy and Immunology [facility]  , Pediatrics and Medicine [employer] at [address], [address] [place] [address] [phone]

## 2023-12-22 NOTE — PHYSICAL EXAM
[Alert] : alert [Well Nourished] : well nourished [Healthy Appearance] : healthy appearance [No Acute Distress] : no acute distress [Well Developed] : well developed [Normal Voice/Communication] : normal voice communication [Normal Pupil & Iris Size/Symmetry] : normal pupil and iris size and symmetry [No Discharge] : no discharge [Sclera Not Icteric] : sclera not icteric [Normal Nasal Mucosa] : the nasal mucosa was normal [Normal Lips/Tongue] : the lips and tongue were normal [Normal Outer Ear/Nose] : the ears and nose were normal in appearance [Supple] : the neck was supple [Normal Rate and Effort] : normal respiratory rhythm and effort [No Crackles] : no crackles [No Retractions] : no retractions [Bilateral Audible Breath Sounds] : bilateral audible breath sounds [Normal Rate] : heart rate was normal  [Normal S1, S2] : normal S1 and S2 [No murmur] : no murmur [Regular Rhythm] : with a regular rhythm [Skin Intact] : skin intact  [No Rash] : no rash [No Skin Lesions] : no skin lesions [Normal Mood] : mood was normal [Normal Affect] : affect was normal [Alert, Awake, Oriented as Age-Appropriate] : alert, awake, oriented as age appropriate [Wheezing] : no wheezing was heard

## 2023-12-22 NOTE — HISTORY OF PRESENT ILLNESS
[Eczematous rashes] : eczematous rashes [Drug Allergies] : drug allergies [de-identified] : This is a 53 year old female with hypothyroidism, asthma, LPRD and GERD presenting for an initial consultation.  Ms. Healy has asthma that is followed by pulmonology. Patient is currently on symbicort 160 2 puffs BID, singulair at bedtime and albuterol as needed.  She is on daily nasal sprays and antihistamine as well for runny nose and nasal congestion. There are no associated ocular symptoms related to seasonal changes. Patient would like environmental allergy testing as she suspects aeroallergen triggers may be contributing to her symptoms.  Ms. Healy also endorses strong fragrances trigger her asthma and they cause runny nose and congestion at times. She has tried to avoid exposure, but wanted to know if there is any allergy testing for this as well. This is often present while she is at her work location and her boyfriend also likes to wear cologne.  When patient was 15 years of age, she developed hives right after eating crab. Patient has been avoiding shellfish since.  There was not associated shortness of breath or wheezing at that time.  She had eaten crab before, but that time, she ate more than she would usually. Currently, patient is on a vegan diet. Avoids wheat and dairy due to IBS.   No eczema. No medication allergies. No pets at home.

## 2023-12-22 NOTE — END OF VISIT
[FreeTextEntry3] : ADI Gil has acted like a scribe on my behalf.  I have reviewed the note and edited where appropriate.  History, PE, assessment, and plan were personally performed by me.

## 2024-01-04 ENCOUNTER — TRANSCRIPTION ENCOUNTER (OUTPATIENT)
Age: 54
End: 2024-01-04

## 2024-01-18 ENCOUNTER — NON-APPOINTMENT (OUTPATIENT)
Age: 54
End: 2024-01-18

## 2024-01-24 ENCOUNTER — TRANSCRIPTION ENCOUNTER (OUTPATIENT)
Age: 54
End: 2024-01-24

## 2024-01-24 ENCOUNTER — NON-APPOINTMENT (OUTPATIENT)
Age: 54
End: 2024-01-24

## 2024-01-25 ENCOUNTER — APPOINTMENT (OUTPATIENT)
Dept: PULMONOLOGY | Facility: CLINIC | Age: 54
End: 2024-01-25
Payer: COMMERCIAL

## 2024-01-25 DIAGNOSIS — J45.909 UNSPECIFIED ASTHMA, UNCOMPLICATED: ICD-10-CM

## 2024-01-25 DIAGNOSIS — R05.8 OTHER SPECIFIED COUGH: ICD-10-CM

## 2024-01-25 DIAGNOSIS — R05.9 COUGH, UNSPECIFIED: ICD-10-CM

## 2024-01-25 PROCEDURE — 99443: CPT

## 2024-01-26 LAB
HMPV RNA SPEC QL NAA+PROBE: DETECTED
RAPID RVP RESULT: DETECTED
SARS-COV-2 RNA PNL RESP NAA+PROBE: NOT DETECTED

## 2024-01-26 RX ORDER — LEVOFLOXACIN 500 MG/1
500 TABLET, FILM COATED ORAL DAILY
Qty: 7 | Refills: 0 | Status: DISCONTINUED | COMMUNITY
Start: 2024-01-25 | End: 2024-01-26

## 2024-01-29 ENCOUNTER — NON-APPOINTMENT (OUTPATIENT)
Age: 54
End: 2024-01-29

## 2024-01-29 RX ORDER — PREDNISONE 10 MG/1
10 TABLET ORAL DAILY
Qty: 14 | Refills: 0 | Status: ACTIVE | COMMUNITY
Start: 2024-01-29 | End: 1900-01-01

## 2024-01-31 ENCOUNTER — NON-APPOINTMENT (OUTPATIENT)
Age: 54
End: 2024-01-31

## 2024-02-01 ENCOUNTER — NON-APPOINTMENT (OUTPATIENT)
Age: 54
End: 2024-02-01

## 2024-02-01 ENCOUNTER — APPOINTMENT (OUTPATIENT)
Dept: RADIOLOGY | Facility: CLINIC | Age: 54
End: 2024-02-01
Payer: COMMERCIAL

## 2024-02-01 ENCOUNTER — APPOINTMENT (OUTPATIENT)
Dept: PULMONOLOGY | Facility: CLINIC | Age: 54
End: 2024-02-01
Payer: COMMERCIAL

## 2024-02-01 VITALS
TEMPERATURE: 98.2 F | HEIGHT: 67 IN | SYSTOLIC BLOOD PRESSURE: 160 MMHG | HEART RATE: 93 BPM | RESPIRATION RATE: 18 BRPM | BODY MASS INDEX: 26.68 KG/M2 | WEIGHT: 170 LBS | OXYGEN SATURATION: 98 % | DIASTOLIC BLOOD PRESSURE: 105 MMHG

## 2024-02-01 PROCEDURE — 99215 OFFICE O/P EST HI 40 MIN: CPT

## 2024-02-01 PROCEDURE — 71046 X-RAY EXAM CHEST 2 VIEWS: CPT

## 2024-02-01 RX ORDER — AMOXICILLIN AND CLAVULANATE POTASSIUM 875; 125 MG/1; MG/1
875-125 TABLET, COATED ORAL
Qty: 14 | Refills: 0 | Status: ACTIVE | COMMUNITY
Start: 2024-02-01 | End: 1900-01-01

## 2024-02-01 NOTE — END OF VISIT
[FreeTextEntry3] :   I, Dr. Mercy Garsia  personally performed the evaluation and management (E/M) services for this established patient who presents today with (a) new problem(s)/exacerbation of (an) existing condition(s). That E/M includes conducting the clinically appropriate interval history &/or exam, assessing all new/exacerbated conditions, and establishing a new plan of care. Today, my TACO, Madhuri Newton NP, , was here to observe my evaluation and management service for this new problem/exacerbated condition and follow the plan of care established by me going forward. [Time Spent: ___ minutes] : I have spent [unfilled] minutes of time on the encounter.

## 2024-02-01 NOTE — DISCUSSION/SUMMARY
[FreeTextEntry1] : -------------Assessment and Plan----53 yo with asthma well controlled-  continue symbicort.  CONTINUE  Current asthma exacerbation d/t metapneumovirus--DID NOT TAKE ANTIBIOTICS  / INFORMS SHE IS ALLERGIC TO Z PACK ,  DID NOT TOLERATE LEVAQUIN -- 1) augmentin 2) pred 20mg with taper 3) CXR TODAY - - - - 4) nebs 4x daily  F/U 6 MONTHS  or sooner if unimproved  Patient at this time  will follow  the above mentioned recommendations and return back for follow up visit. If you have any questions  I can be reached  at #  [phone].    Mercy Garsia MD, [facility]

## 2024-02-01 NOTE — HISTORY OF PRESENT ILLNESS
[Never] : never [Worsened] : have worsened [Difficulty Breathing During Exertion] : worsened dyspnea on exertion [Cough] : worsened coughing [TextBox_4] : 54 yo w/untreated asthma- self referred for 2nd opinion for her bronchial asthma--------has not been on prednisone has never been intubated-- no history of fever chills rigors chest hemoptysis----------No history of , fever, chills , rigors, chestpain, or hemoptysis. Questionable history of Raynauds phenomenon. No h/o significant weight loss in last few months. No history of liver dysfunction , collagen vascular disorder or chronic thromboembolic disease. I would classify her dyspnea as WHO  FUNCTIONAL CLASS II--------------------- PFT 2018 mild obstruction cxr- 2018 clear lungs --------------- ----- 5/ 2015 ---- ct chest -----------4mm left apical nodule and 3-4 m, left lingula nodule . h/o allergy testing w/allergy to mold------------- lifelong nonsmoker-------------   sept 2019- asthma- s/p urgicenter visit last week for CP- states EKG normal. No resp complaints-- on  inhaled steroids and bronchodilators, no further exacerbations  8/2020 pft normal lung volumes august 2020 asthma controlled on symbicort and singulair, uses ventolin as needed   10/2021 asthma exacerbation- started prednisone this weekend (20mg) on nebs and symbicort, covid swab neg, afebrile Up to date w/covid and influenza vac  12/2021 pft normal lung volumes 12/2021 asthma now stable- no current complaints, using inhalers  4/2022 asthma exacerbation- self treated w/mucinex, taking nebs 4x daily. Afebrile   11/2022 asthma stable , off inhalers for now last exacerbation was early october UTD w/covid and influenza vac   12/19/2022 teledoc appt- no apparent distress tested positive for covid 12/19 having nasal congestion, afebrile, mild wheezing, o2 hussain 99% room air UTD w/covid vac & boosters     12/22 teledoc appt- recovering from covid- still w/some coughing on pred and levaquin, using inhalers, afebrile  7/2023 PFT- normal lung volumes 7/2023 asthma stable, on inhalers, no pulm complaints   2/2024  asthma exacerbated by metapneumovirus took ped 10mg and nebs 2x daily but didnt help so increased to pred 20mg and nebs 4x daily yesterday and feels better today- still wheezing took levaquin but stopped fater 1 day d/t side effects-NEEDS CXR - - - -   [Oxygen] : the patient uses no supplemental oxygen

## 2024-02-01 NOTE — PHYSICAL EXAM
[No Acute Distress] : no acute distress [Normal Oropharynx] : normal oropharynx [Normal Appearance] : normal appearance [No Neck Mass] : no neck mass [Normal Rate/Rhythm] : normal rate/rhythm [Normal S1, S2] : normal s1, s2 [No Murmurs] : no murmurs [No Resp Distress] : no resp distress [No Abnormalities] : no abnormalities [Benign] : benign [Normal Gait] : normal gait [No Clubbing] : no clubbing [No Cyanosis] : no cyanosis [No Edema] : no edema [FROM] : FROM [Normal Color/ Pigmentation] : normal color/ pigmentation [No Focal Deficits] : no focal deficits [Oriented x3] : oriented x3 [Normal Affect] : normal affect [TextBox_68] : wheezing

## 2024-02-03 ENCOUNTER — NON-APPOINTMENT (OUTPATIENT)
Age: 54
End: 2024-02-03

## 2024-02-05 RX ORDER — ALBUTEROL SULFATE 2.5 MG/3ML
(2.5 MG/3ML) SOLUTION RESPIRATORY (INHALATION) 4 TIMES DAILY
Qty: 360 | Refills: 1 | Status: ACTIVE | COMMUNITY
Start: 2024-02-05 | End: 1900-01-01

## 2024-02-20 RX ORDER — LINACLOTIDE 290 UG/1
290 CAPSULE, GELATIN COATED ORAL
Qty: 90 | Refills: 1 | Status: ACTIVE | COMMUNITY
Start: 2021-06-28 | End: 1900-01-01

## 2024-02-28 ENCOUNTER — RX RENEWAL (OUTPATIENT)
Age: 54
End: 2024-02-28

## 2024-03-15 ENCOUNTER — RX RENEWAL (OUTPATIENT)
Age: 54
End: 2024-03-15

## 2024-04-17 ENCOUNTER — TRANSCRIPTION ENCOUNTER (OUTPATIENT)
Age: 54
End: 2024-04-17

## 2024-04-17 ENCOUNTER — NON-APPOINTMENT (OUTPATIENT)
Age: 54
End: 2024-04-17

## 2024-04-29 ENCOUNTER — RX RENEWAL (OUTPATIENT)
Age: 54
End: 2024-04-29

## 2024-05-15 ENCOUNTER — APPOINTMENT (OUTPATIENT)
Dept: ORTHOPEDIC SURGERY | Facility: CLINIC | Age: 54
End: 2024-05-15
Payer: COMMERCIAL

## 2024-05-15 ENCOUNTER — RX RENEWAL (OUTPATIENT)
Age: 54
End: 2024-05-15

## 2024-05-15 VITALS — HEIGHT: 67 IN | WEIGHT: 170 LBS | BODY MASS INDEX: 26.68 KG/M2

## 2024-05-15 DIAGNOSIS — M77.11 LATERAL EPICONDYLITIS, RIGHT ELBOW: ICD-10-CM

## 2024-05-15 PROCEDURE — 99203 OFFICE O/P NEW LOW 30 MIN: CPT

## 2024-05-15 PROCEDURE — 73080 X-RAY EXAM OF ELBOW: CPT | Mod: RT

## 2024-05-15 NOTE — DATA REVIEWED
[FreeTextEntry1] : 3 view right elbow skeletally mature no overt osseous abnormalities normal alignment

## 2024-05-15 NOTE — IMAGING
[de-identified] : Constitutional: Healthy, and well nourished in no acute distress.  Psych: Calm, cooperative, grossly normal  Eyes: Normal, sclera non-icteric  Ears, Nose, Mouth, Throat: External inspection of nose and ears does not reveal any scars or masses  Head: Normocephalic  Neck: Neck appears supple without sign of limited or painful ROM  Respiratory: Normal effort, no respiratory distress, no cyanosis  Cardiovascular: Visualized extremities without edema or varicosities, warm, brisk cap refill  Abdominal/GI: Not examined  Skin: No rashes on the extremity examined.  Neurological: Patient is awake and alert    right elbow She is RHD Moderate tenderness to the lateral condyle as well as ECR origin at LC Mild tenderness to ME and flexor pronator tendon No shoulder tenderness. No neck ROM limitations. full ROM to the elbow. Normal wrist ROM pain with resisted wrist extension Ok with flexion and ok with supination, pronation. finger intrinsics are ok Neurovascular examination of the upper limb is normal. Fingertips pink, brisk capillary refill. Intact flexor digitorum profundus, flexor digitorum superficialis, flexor pollicis longus, extensor digitorum communis, extensor pollicis longus, and first dorsal interosseous. Sensation intact to light touch in the median, ulnar, and radial nerve distributions.

## 2024-05-15 NOTE — DISCUSSION/SUMMARY
[de-identified] : The patient was advised of the diagnosis. The natural history of the pathology was explained in full to the patient  in layman's terms.  dx: lateral epicondylitis Here is the plan that we have set forth today. Here are my current recommendations:  1. This is basically a condition of overuse but strength, flexibility and mechanics all play a large role in the condition.  There are probably a variety of intrinsic and extrinsic factors that collectively predispose one to this condition however we can only modify those that are extrinsic.  2. Start PT- this will be our mainstay of treatment.  It will allow you to build flexibility, strength, normal functional movements and sports specific skills with proper mechanics.  We look at the whole arm as well as the trunk and core as these all integrate collectively while you play and swing.  I have provided you a prescription and some local facility recommendations.  I recommend PT an average of 2 days a week followed by home exercises as prescribed another 2-3 days a week.  PT will progress you back to sport skills when they see fit. Lend on their expertise to guide you back to play.  3. For now ice and motrin for pain.  Be conscientious of what causes soreness and try to avoid that activity for now.  4. briefly discussed injection, vs PRP- patient not interested in any of these at this point. 5. follow up in a few weeks if not improving with PT The patient understands the plan of care as described above.  All questions have been answered. Thank you for allowing me to care for CHANO. Sincerely, Jolanta Gibbs, DO, FAAP, CAQ[facility]

## 2024-05-15 NOTE — HISTORY OF PRESENT ILLNESS
[de-identified] : 05/15/2024 : CHANO ANN is a 53 year F here today in regard to RT elbow/wrist pain. New patient to my practice. She has asthma, hypothyroidism, GERD and IBS Meds- see scanned in medical report DIet: plant based- gluten free No poultry no dairy no refined sugar.  In regards to her cc: no injury or trauma She has lateral elbow pain- worse with wrist extension, worse with  and some pain with twisting rotational movements. No overt swelling or bruising  Tried an elbow strap with minimal relief. looking for diagnosis and next steps. No workup to date. perimenopausal but still gets a period.    Review of Systems: Constitutional: negative HEENT: negative CV: negative Pulm: negative GI: negative : negative Neuro: negative Skin: negative Endocrine: negative Heme: negative MSK: See HPI.

## 2024-05-29 ENCOUNTER — RX RENEWAL (OUTPATIENT)
Age: 54
End: 2024-05-29

## 2024-05-29 RX ORDER — MONTELUKAST 10 MG/1
10 TABLET, FILM COATED ORAL
Qty: 90 | Refills: 0 | Status: ACTIVE | COMMUNITY
Start: 2021-12-06 | End: 1900-01-01

## 2024-07-22 ENCOUNTER — APPOINTMENT (OUTPATIENT)
Dept: PULMONOLOGY | Facility: CLINIC | Age: 54
End: 2024-07-22
Payer: COMMERCIAL

## 2024-07-22 PROCEDURE — 99214 OFFICE O/P EST MOD 30 MIN: CPT

## 2024-07-22 RX ORDER — PREDNISONE 10 MG/1
10 TABLET ORAL DAILY
Qty: 10 | Refills: 1 | Status: ACTIVE | COMMUNITY
Start: 2024-07-22 | End: 1900-01-01

## 2024-07-22 RX ORDER — AMOXICILLIN AND CLAVULANATE POTASSIUM 875; 125 MG/1; MG/1
875-125 TABLET, COATED ORAL
Qty: 20 | Refills: 0 | Status: ACTIVE | COMMUNITY
Start: 2024-07-22 | End: 1900-01-01

## 2024-07-22 NOTE — REASON FOR VISIT
[Home] : at home, [unfilled] , at the time of the visit. [Medical Office: ([facility])___] : at the medical office located in  [Patient] : the patient [Asthma] : asthma [Follow-Up] : a follow-up visit [TextBox_44] : covid

## 2024-07-22 NOTE — HISTORY OF PRESENT ILLNESS
[Never] : never [Worsened] : have worsened [Difficulty Breathing During Exertion] : worsened dyspnea on exertion [Cough] : worsened coughing [TextBox_4] : 52 yo w/untreated asthma- self referred for 2nd opinion for her bronchial asthma--------has not been on prednisone has never been intubated-- no history of fever chills rigors chest hemoptysis----------No history of , fever, chills , rigors, chestpain, or hemoptysis. Questionable history of Raynauds phenomenon. No h/o significant weight loss in last few months. No history of liver dysfunction , collagen vascular disorder or chronic thromboembolic disease. I would classify her dyspnea as WHO  FUNCTIONAL CLASS II--------------------- PFT 2018 mild obstruction cxr- 2018 clear lungs --------------- ----- 5/ 2015 ---- ct chest -----------4mm left apical nodule and 3-4 m, left lingula nodule . h/o allergy testing w/allergy to mold------------- lifelong nonsmoker-------------   sept 2019- asthma- s/p urgicenter visit last week for CP- states EKG normal. No resp complaints-- on  inhaled steroids and bronchodilators, no further exacerbations  8/2020 pft normal lung volumes august 2020 asthma controlled on symbicort and singulair, uses ventolin as needed   10/2021 asthma exacerbation- started prednisone this weekend (20mg) on nebs and symbicort, covid swab neg, afebrile Up to date w/covid and influenza vac  12/2021 pft normal lung volumes 12/2021 asthma now stable- no current complaints, using inhalers  4/2022 asthma exacerbation- self treated w/mucinex, taking nebs 4x daily. Afebrile   11/2022 asthma stable , off inhalers for now last exacerbation was early october UTD w/covid and influenza vac   12/19/2022 teledoc appt- no apparent distress tested positive for covid 12/19 having nasal congestion, afebrile, mild wheezing, o2 hussain 99% room air UTD w/covid vac & boosters     12/22 teledoc appt- recovering from covid- still w/some coughing on pred and levaquin, using inhalers, afebrile  7/2023 PFT- normal lung volumes 7/2023 asthma stable, on inhalers, no pulm complaints   2/2024  asthma exacerbated by metapneumovirus took ped 10mg and nebs 2x daily but didnt help so increased to pred 20mg and nebs 4x daily yesterday and feels better today- still wheezing took levaquin but stopped fater 1 day d/t side effects-NEEDS CXR - - - -   7/2024 TEB for URI- nasal congestion/cough, afebrile home covid test negative  [Oxygen] : the patient uses no supplemental oxygen

## 2024-07-22 NOTE — DISCUSSION/SUMMARY
[FreeTextEntry1] : -------------Assessment and Plan----53 yo with asthma well controlled-  continue symbicort.  CONTINUE  Current asthma exacerbation d/t metapneumovirus--DID NOT TAKE ANTIBIOTICS  / INFORMS SHE IS ALLERGIC TO Z PACK ,  DID NOT TOLERATE LEVAQUIN -- 1) augmentin 2) pred 10mg 3)mucinex 4) nebs 4x daily 5) f/u thursday if unimproved  Will  follow  the above mentioned recommendations and return back for follow up visit. If you have any questions  I can be reached  at #  [phone].    Mercy Garsia MD, [facility]

## 2024-07-29 ENCOUNTER — NON-APPOINTMENT (OUTPATIENT)
Age: 54
End: 2024-07-29

## 2024-07-29 LAB
FLUAV SUBTYP SPEC NAA+PROBE: DETECTED
RAPID RVP RESULT: DETECTED
SARS-COV-2 RNA PNL RESP NAA+PROBE: DETECTED

## 2024-08-14 ENCOUNTER — RX RENEWAL (OUTPATIENT)
Age: 54
End: 2024-08-14

## 2024-08-19 ENCOUNTER — RX RENEWAL (OUTPATIENT)
Age: 54
End: 2024-08-19

## 2024-08-30 ENCOUNTER — RX RENEWAL (OUTPATIENT)
Age: 54
End: 2024-08-30

## 2024-09-09 NOTE — ED PROVIDER NOTE - WR ORDER NAME 2
In an effort to ensure that our patients LiveWell, a Team Member has reviewed your chart and identified an opportunity to provide the best care possible. An attempt was made to discuss or schedule due or overdue Preventive or Chronic Condition care.    The Outcome was Contact was not made, no answer/busy. We are attempting to schedule a nurse visit. If you have any questions or need help with scheduling, contact our Health Outreach Team at [phone]. Care Gaps identified: Colorectal Cancer Screening, Immunizations, and Wellness Visits.    Appointment needed: Comprehensive Annual Visit    Services coordinated include: None  .Name: SHIVA CHIDI    ### Patient Details  YOB: 1948  MRN: [id number]    ### Encounter Details  Arrival Date: N/A  Discharge Date: N/A  Encounter ID: [id number] 09:[id number]    ### Related interaction  [facility]  [place] Comprehensive Annual Visit ([place] [facility] Outreach) ([email or web address])   Xray Toes, Left Foot

## 2024-09-12 ENCOUNTER — APPOINTMENT (OUTPATIENT)
Dept: ENDOCRINOLOGY | Facility: CLINIC | Age: 54
End: 2024-09-12

## 2024-09-12 VITALS
DIASTOLIC BLOOD PRESSURE: 82 MMHG | OXYGEN SATURATION: 98 % | HEIGHT: 67 IN | HEART RATE: 85 BPM | BODY MASS INDEX: 25.99 KG/M2 | WEIGHT: 165.56 LBS | SYSTOLIC BLOOD PRESSURE: 123 MMHG

## 2024-09-12 DIAGNOSIS — R79.89 OTHER SPECIFIED ABNORMAL FINDINGS OF BLOOD CHEMISTRY: ICD-10-CM

## 2024-09-12 DIAGNOSIS — E66.3 OVERWEIGHT: ICD-10-CM

## 2024-09-12 DIAGNOSIS — E03.9 HYPOTHYROIDISM, UNSPECIFIED: ICD-10-CM

## 2024-09-12 DIAGNOSIS — Z00.00 ENCOUNTER FOR GENERAL ADULT MEDICAL EXAMINATION W/OUT ABNORMAL FINDINGS: ICD-10-CM

## 2024-09-12 PROCEDURE — G2211 COMPLEX E/M VISIT ADD ON: CPT | Mod: NC

## 2024-09-12 PROCEDURE — 36415 COLL VENOUS BLD VENIPUNCTURE: CPT

## 2024-09-12 PROCEDURE — 99204 OFFICE O/P NEW MOD 45 MIN: CPT

## 2024-09-12 NOTE — HISTORY OF PRESENT ILLNESS
[FreeTextEntry1] : Ms. ANN  is a  54 year old female who presents for initial endocrine evaluation. She presents with regard to a history of hypothyroidism diagnosed around 15 years ago . She presents via the kind courtesy of Dr. corley  prior was following with Dr. goldsmith.   She  is currently taking synthroid 112 mcg daily and  has been compliant in taking the LT4 daily, away from food or any medication that may inhibit absorption.   She has tolerated this medication well without  any apparent adverse effects.  She denies any temperature intolerance, significant weight changes, or severe fatigue. She  in addition denies any palpitations, tremors, anxiousness, change in bowel habits or significant change in moods. .  No hx of hashimoto's  no prior hx of having thyroid US done   Her mother and maternal grandmother have hypothyroidism   labs from august 2024  a1c 5.0  free t4 1.21 TSH 1.22 LDl 99  HDl 69  trig 142  total chol 192   She is taking wegovy 1.7 mg/week started in June 2024. This week the dose is being titrated up to 2.4 mg/week , tolerating it well. Does have mild constipation  She is paying out of pocket for this  prior was taking qsymia but then was having BP elevations  current weight  : 165 lbs  starting weight:  192 lb prior to qsymia   following plant based and gluten free diet  No turkey, duck, chicken, dairy, refined sugar   physical acitvity: strength training 2x per week, pilates reformer 1x, swimming 2x, elliptical    menses irregular , has been told she is close to perimenopause LMP 8/1/24   Additional medical history includes that of asthma, allergic rhinitis, IBS, GERD  medications/Supplements: singulari 10 mg, zyzal 5 mg PRN , symbicort, cytomel 2.5 mg , omeprazole 20 mg, linzess, rizatriptan 5 mg , ventolin PRN, hyoscamine 1/2 tab  PRN   supplements:  vitamin D unsure of dose, gentle iron, MV, probiotic, b12 5000 mcg QD , magnesium glycinate 200 mg, super amino, prebiotic   Soc Hx: Denies smoking, drinking. In past has tried LSD 2 times and marijuana in college.   Allg: erythromycin, zithromac,   FH: Mother: Dm2 , HTN , glaucoma, neuroendocrine tumor in stomach   Father: heart transplant   colonoscopy summer 2023  Mammo 2023  GYN/pap Jan 2024

## 2024-10-16 ENCOUNTER — RX RENEWAL (OUTPATIENT)
Age: 54
End: 2024-10-16

## 2024-10-21 ENCOUNTER — APPOINTMENT (OUTPATIENT)
Dept: ULTRASOUND IMAGING | Facility: CLINIC | Age: 54
End: 2024-10-21
Payer: COMMERCIAL

## 2024-10-21 ENCOUNTER — APPOINTMENT (OUTPATIENT)
Dept: MAMMOGRAPHY | Facility: CLINIC | Age: 54
End: 2024-10-21
Payer: COMMERCIAL

## 2024-10-21 PROCEDURE — 77067 SCR MAMMO BI INCL CAD: CPT

## 2024-10-21 PROCEDURE — 77063 BREAST TOMOSYNTHESIS BI: CPT

## 2024-10-21 PROCEDURE — 76641 ULTRASOUND BREAST COMPLETE: CPT | Mod: 50

## 2024-11-05 ENCOUNTER — TRANSCRIPTION ENCOUNTER (OUTPATIENT)
Age: 54
End: 2024-11-05

## 2024-11-06 ENCOUNTER — TRANSCRIPTION ENCOUNTER (OUTPATIENT)
Age: 54
End: 2024-11-06

## 2024-12-02 ENCOUNTER — APPOINTMENT (OUTPATIENT)
Dept: PULMONOLOGY | Facility: CLINIC | Age: 54
End: 2024-12-02
Payer: COMMERCIAL

## 2024-12-02 ENCOUNTER — TRANSCRIPTION ENCOUNTER (OUTPATIENT)
Age: 54
End: 2024-12-02

## 2024-12-02 ENCOUNTER — RX RENEWAL (OUTPATIENT)
Age: 54
End: 2024-12-02

## 2024-12-02 PROCEDURE — 99443: CPT

## 2024-12-02 RX ORDER — AZITHROMYCIN 250 MG/1
250 TABLET, FILM COATED ORAL
Qty: 1 | Refills: 0 | Status: ACTIVE | COMMUNITY
Start: 2024-12-02 | End: 1900-01-01

## 2024-12-02 RX ORDER — TIRZEPATIDE 12.5 MG/.5ML
12.5 INJECTION, SOLUTION SUBCUTANEOUS
Qty: 1 | Refills: 2 | Status: ACTIVE | COMMUNITY
Start: 2024-12-02 | End: 1900-01-01

## 2024-12-09 RX ORDER — HYOSCYAMINE SULFATE 0.38 MG/1
0.38 TABLET, EXTENDED RELEASE ORAL
Qty: 180 | Refills: 3 | Status: ACTIVE | COMMUNITY
Start: 2024-12-09 | End: 1900-01-01

## 2025-02-11 RX ORDER — TIRZEPATIDE 10 MG/.5ML
10 INJECTION, SOLUTION SUBCUTANEOUS
Qty: 1 | Refills: 1 | Status: ACTIVE | COMMUNITY
Start: 2025-02-11 | End: 1900-01-01

## 2025-02-18 ENCOUNTER — RX RENEWAL (OUTPATIENT)
Age: 55
End: 2025-02-18

## 2025-03-03 ENCOUNTER — RX RENEWAL (OUTPATIENT)
Age: 55
End: 2025-03-03

## 2025-03-11 DIAGNOSIS — E66.3 OVERWEIGHT: ICD-10-CM

## 2025-03-17 RX ORDER — SEMAGLUTIDE 1 MG/.5ML
1 INJECTION, SOLUTION SUBCUTANEOUS
Qty: 3 | Refills: 1 | Status: ACTIVE | COMMUNITY
Start: 2025-03-11

## 2025-05-19 RX ORDER — SEMAGLUTIDE 1.7 MG/.75ML
1.7 INJECTION, SOLUTION SUBCUTANEOUS
Qty: 1 | Refills: 2 | Status: ACTIVE | COMMUNITY
Start: 2025-05-19 | End: 1900-01-01

## 2025-05-28 ENCOUNTER — RX RENEWAL (OUTPATIENT)
Age: 55
End: 2025-05-28

## 2025-06-04 ENCOUNTER — NON-APPOINTMENT (OUTPATIENT)
Age: 55
End: 2025-06-04

## 2025-06-16 ENCOUNTER — TRANSCRIPTION ENCOUNTER (OUTPATIENT)
Age: 55
End: 2025-06-16

## 2025-06-18 RX ORDER — TIRZEPATIDE 10 MG/.5ML
10 INJECTION, SOLUTION SUBCUTANEOUS
Qty: 1 | Refills: 2 | Status: ACTIVE | COMMUNITY
Start: 2025-06-18 | End: 1900-01-01

## 2025-07-07 ENCOUNTER — RX RENEWAL (OUTPATIENT)
Age: 55
End: 2025-07-07

## 2025-07-14 ENCOUNTER — APPOINTMENT (OUTPATIENT)
Dept: OTOLARYNGOLOGY | Facility: CLINIC | Age: 55
End: 2025-07-14
Payer: COMMERCIAL

## 2025-07-14 VITALS
DIASTOLIC BLOOD PRESSURE: 87 MMHG | HEIGHT: 67 IN | BODY MASS INDEX: 24.48 KG/M2 | WEIGHT: 156 LBS | HEART RATE: 63 BPM | OXYGEN SATURATION: 100 % | SYSTOLIC BLOOD PRESSURE: 132 MMHG

## 2025-07-14 PROCEDURE — 92557 COMPREHENSIVE HEARING TEST: CPT

## 2025-07-14 PROCEDURE — 92567 TYMPANOMETRY: CPT

## 2025-07-14 PROCEDURE — 99213 OFFICE O/P EST LOW 20 MIN: CPT

## 2025-07-14 RX ORDER — OMEPRAZOLE 20 MG/1
20 CAPSULE, DELAYED RELEASE ORAL
Refills: 0 | Status: ACTIVE | COMMUNITY

## 2025-07-28 ENCOUNTER — APPOINTMENT (OUTPATIENT)
Dept: ENDOCRINOLOGY | Facility: CLINIC | Age: 55
End: 2025-07-28
Payer: COMMERCIAL

## 2025-07-28 VITALS
HEART RATE: 69 BPM | WEIGHT: 156.44 LBS | DIASTOLIC BLOOD PRESSURE: 80 MMHG | SYSTOLIC BLOOD PRESSURE: 118 MMHG | BODY MASS INDEX: 24.55 KG/M2 | OXYGEN SATURATION: 99 % | HEIGHT: 67 IN

## 2025-07-28 DIAGNOSIS — R79.89 OTHER SPECIFIED ABNORMAL FINDINGS OF BLOOD CHEMISTRY: ICD-10-CM

## 2025-07-28 DIAGNOSIS — E66.3 OVERWEIGHT: ICD-10-CM

## 2025-07-28 DIAGNOSIS — E03.9 HYPOTHYROIDISM, UNSPECIFIED: ICD-10-CM

## 2025-07-28 PROCEDURE — 36415 COLL VENOUS BLD VENIPUNCTURE: CPT

## 2025-07-28 PROCEDURE — 99214 OFFICE O/P EST MOD 30 MIN: CPT

## 2025-07-28 PROCEDURE — G2211 COMPLEX E/M VISIT ADD ON: CPT | Mod: NC

## 2025-07-29 LAB
25(OH)D3 SERPL-MCNC: 66.4 NG/ML
ALBUMIN SERPL ELPH-MCNC: 4.3 G/DL
ALP BLD-CCNC: 59 U/L
ALT SERPL-CCNC: 22 U/L
ANION GAP SERPL CALC-SCNC: 12 MMOL/L
AST SERPL-CCNC: 23 U/L
BILIRUB SERPL-MCNC: 0.7 MG/DL
BUN SERPL-MCNC: 7 MG/DL
CALCIUM SERPL-MCNC: 9.3 MG/DL
CHLORIDE SERPL-SCNC: 101 MMOL/L
CHOLEST SERPL-MCNC: 156 MG/DL
CO2 SERPL-SCNC: 24 MMOL/L
CREAT SERPL-MCNC: 0.88 MG/DL
EGFRCR SERPLBLD CKD-EPI 2021: 79 ML/MIN/1.73M2
ESTIMATED AVERAGE GLUCOSE: 94 MG/DL
ESTRADIOL SERPL-MCNC: 135 PG/ML
FSH SERPL-MCNC: 60.3 IU/L
GLUCOSE SERPL-MCNC: 77 MG/DL
HBA1C MFR BLD HPLC: 4.9 %
HDLC SERPL-MCNC: 74 MG/DL
INSULIN P FAST SERPL-ACNC: 3 UU/ML
INSULIN SERPL-MCNC: 3.3 UU/ML
LDLC SERPL DIRECT ASSAY-MCNC: 77 MG/DL
LH SERPL-ACNC: 68.9 IU/L
POTASSIUM SERPL-SCNC: 4.2 MMOL/L
PROGEST SERPL-MCNC: 0.5 NG/ML
PROT SERPL-MCNC: 7 G/DL
SODIUM SERPL-SCNC: 137 MMOL/L
T3FREE SERPL-MCNC: 2.71 PG/ML
T4 FREE SERPL-MCNC: 1.5 NG/DL
THYROGLOB AB SERPL-ACNC: 18 IU/ML
THYROPEROXIDASE AB SERPL IA-ACNC: 11.3 IU/ML
TRIGL SERPL-MCNC: 54 MG/DL
TSH SERPL-ACNC: 0.87 UIU/ML
VIT B12 SERPL-MCNC: >2000 PG/ML

## 2025-08-01 LAB — VIT B1 SERPL-MCNC: 105.9 NMOL/L

## 2025-08-06 LAB
NICOTINAMIDE: 60 NG/ML
NICOTINIC ACID: <5 NG/ML

## 2025-08-07 ENCOUNTER — NON-APPOINTMENT (OUTPATIENT)
Age: 55
End: 2025-08-07

## 2025-08-28 ENCOUNTER — RX RENEWAL (OUTPATIENT)
Age: 55
End: 2025-08-28